# Patient Record
Sex: FEMALE | Race: WHITE | NOT HISPANIC OR LATINO | URBAN - METROPOLITAN AREA
[De-identification: names, ages, dates, MRNs, and addresses within clinical notes are randomized per-mention and may not be internally consistent; named-entity substitution may affect disease eponyms.]

---

## 2018-01-24 ENCOUNTER — OFFICE VISIT CONVERTED (OUTPATIENT)
Dept: SURGERY | Facility: CLINIC | Age: 68
End: 2018-01-24
Attending: SURGERY

## 2018-01-24 ENCOUNTER — CONVERSION ENCOUNTER (OUTPATIENT)
Dept: SURGERY | Facility: CLINIC | Age: 68
End: 2018-01-24

## 2018-03-06 ENCOUNTER — OFFICE VISIT CONVERTED (OUTPATIENT)
Dept: SURGERY | Facility: CLINIC | Age: 68
End: 2018-03-06
Attending: SURGERY

## 2021-05-16 VITALS — BODY MASS INDEX: 37.9 KG/M2 | RESPIRATION RATE: 16 BRPM | WEIGHT: 188 LBS | HEIGHT: 59 IN

## 2021-05-16 VITALS — WEIGHT: 188 LBS | BODY MASS INDEX: 37.9 KG/M2 | HEIGHT: 59 IN | RESPIRATION RATE: 16 BRPM

## 2023-11-27 PROBLEM — N39.41 URINARY INCONTINENCE, URGE: Status: ACTIVE | Noted: 2023-11-27

## 2023-11-27 PROBLEM — K63.9 BOWEL DISEASE: Status: ACTIVE | Noted: 2023-11-27

## 2023-11-27 PROBLEM — I50.9 CONGESTIVE HEART FAILURE: Status: ACTIVE | Noted: 2023-11-27

## 2023-11-27 PROBLEM — N30.00 ACUTE CYSTITIS WITHOUT HEMATURIA: Status: ACTIVE | Noted: 2023-11-27

## 2023-11-27 PROBLEM — N95.2 VAGINAL ATROPHY: Status: ACTIVE | Noted: 2023-11-27

## 2023-11-27 PROBLEM — I10 HIGH BLOOD PRESSURE: Status: ACTIVE | Noted: 2023-11-27

## 2023-11-27 PROBLEM — E78.00 HIGH CHOLESTEROL: Status: ACTIVE | Noted: 2023-11-27

## 2023-11-27 PROBLEM — R06.02 SHORTNESS OF BREATH: Status: ACTIVE | Noted: 2023-11-27

## 2023-11-27 PROBLEM — I63.9 STROKE: Status: ACTIVE | Noted: 2023-11-27

## 2023-11-27 RX ORDER — POTASSIUM CHLORIDE 20MEQ/15ML
15 LIQUID (ML) ORAL
COMMUNITY

## 2023-11-27 RX ORDER — FUROSEMIDE 40 MG/1
TABLET ORAL
COMMUNITY

## 2023-11-27 RX ORDER — METOPROLOL SUCCINATE 50 MG/1
TABLET, EXTENDED RELEASE ORAL
COMMUNITY

## 2023-11-27 RX ORDER — MONTELUKAST SODIUM 10 MG/1
TABLET ORAL
COMMUNITY

## 2023-11-27 RX ORDER — ASPIRIN 81 MG/1
TABLET, CHEWABLE ORAL
COMMUNITY

## 2023-11-27 RX ORDER — TRANDOLAPRIL AND VERAPAMIL HYDROCHLORIDE 2; 180 MG/1; MG/1
TABLET, FILM COATED, EXTENDED RELEASE ORAL
COMMUNITY

## 2023-11-27 RX ORDER — SPIRONOLACTONE 25 MG/1
TABLET ORAL
COMMUNITY

## 2023-11-27 RX ORDER — CLOPIDOGREL BISULFATE 75 MG/1
TABLET ORAL
COMMUNITY

## 2023-11-27 RX ORDER — HYDROCHLOROTHIAZIDE 25 MG/1
TABLET ORAL
COMMUNITY

## 2023-11-27 RX ORDER — CLONIDINE 0.1 MG/24H
PATCH, EXTENDED RELEASE TRANSDERMAL
COMMUNITY

## 2023-11-29 ENCOUNTER — OFFICE VISIT (OUTPATIENT)
Dept: UROLOGY | Facility: CLINIC | Age: 73
End: 2023-11-29
Payer: MEDICARE

## 2023-11-29 VITALS
WEIGHT: 188.2 LBS | BODY MASS INDEX: 37.94 KG/M2 | HEART RATE: 74 BPM | DIASTOLIC BLOOD PRESSURE: 82 MMHG | SYSTOLIC BLOOD PRESSURE: 133 MMHG | HEIGHT: 59 IN

## 2023-11-29 DIAGNOSIS — N28.1 RENAL CYST: ICD-10-CM

## 2023-11-29 DIAGNOSIS — N28.89 RENAL MASS: Primary | ICD-10-CM

## 2023-11-29 PROCEDURE — 99204 OFFICE O/P NEW MOD 45 MIN: CPT | Performed by: UROLOGY

## 2023-11-29 RX ORDER — HYDRALAZINE HYDROCHLORIDE 25 MG/1
TABLET, FILM COATED ORAL
COMMUNITY
Start: 2023-10-22

## 2023-11-29 RX ORDER — ERGOCALCIFEROL 1.25 MG/1
CAPSULE ORAL
COMMUNITY
Start: 2023-10-22

## 2023-11-29 RX ORDER — LOVASTATIN 20 MG/1
TABLET ORAL
COMMUNITY
Start: 2023-10-22

## 2023-11-29 RX ORDER — ALLOPURINOL 300 MG/1
TABLET ORAL
COMMUNITY
Start: 2023-10-22

## 2023-11-29 NOTE — PROGRESS NOTES
UROLOGY OFFICE H&P NOTE    Subjective   HPI  Bisi Bhatt is a 73 y.o. female.  Presents for evaluation of renal mass on her right kidney and cyst on the left. She is accompanied by her .    The patient had a scan done due to abdominal pain. A mass was found on her right kidney and a cyst on the left. Her primary care physician told her that she needed to have another scan done. The radiologist told her that it could be renal cell carcinoma.     She denies any family history of kidney cancer or bladder cancer.   She has a family history of breast cancer.     She denies any history of hematuria.   She denies any history of kidney issues.   She is not diabetic.     She is taking aspirin and Plavix.   She has had a couple of strokes.     She has had hysterectomy and cholecystectomy.   She is not a smoker.   She denies any recent weight loss.      _________  CT abdomen pelvis wwo contrast 10/9/2023: 6 cm exophytic round mass in the upper pole the right kidney of soft tissue attenuation (22 Hounsfield units) demonstrates contrast enhancement (62 Hounsfield units) consistent with a solid mass worrisome for renal cell carcinoma. No tumor thrombus within the right renal vein or inferior vena cava. No adjacent retroperitoneal lymphadenopathy. 4 cm exophytic cyst of the posterior cortex midsection of left kidney.     Creatinine 9/27/2023: 1.1    No results found for this or any previous visit.      Medical History:  Past Medical History:   Diagnosis Date    Hypertension         Social History:  Social History     Socioeconomic History    Marital status:    Tobacco Use    Smoking status: Never    Smokeless tobacco: Never   Vaping Use    Vaping Use: Never used   Substance and Sexual Activity    Alcohol use: Never    Drug use: Never    Sexual activity: Yes     Partners: Male     Birth control/protection: Hysterectomy        Family History:  History reviewed. No pertinent family history.     Surgical  History:  Past Surgical History:   Procedure Laterality Date    APPENDECTOMY      HYSTERECTOMY      TUBAL ABDOMINAL LIGATION          Allergies:  Allergies   Allergen Reactions    Penicillins Anaphylaxis        Current Medications:  Current Outpatient Medications   Medication Sig Dispense Refill    allopurinol (ZYLOPRIM) 300 MG tablet       aspirin 81 MG chewable tablet aspirin 81 mg oral tablet,chewable chew 1 tablet (81 mg) by oral route once daily   Active      clopidogrel (Plavix) 75 MG tablet Plavix 75 mg oral tablet take 1 tablet (75 mg) by oral route once daily   Active      hydrALAZINE (APRESOLINE) 25 MG tablet       lovastatin (MEVACOR) 20 MG tablet       metoprolol succinate XL (Toprol XL) 50 MG 24 hr tablet Toprol XL 50 mg oral tablet extended release 24 hr take 1 tablet (50 mg) by oral route once daily   Active      montelukast (Singulair) 10 MG tablet Singulair 10 mg oral tablet take 1 tablet (10 mg) by oral route once daily in the evening   Active      potassium chloride (KAYCIEL) 20 mEq/15 mL solution 15 mL.      spironolactone (ALDACTONE) 25 MG tablet spironolactone 25 mg oral tablet take 1 tablet (25 mg) by oral route every other day   Active      trandolapril-verapamil (Tarka) 2-180 MG per CR tablet Tarka 2-180 mg oral tablet, IR - ER, biphasic 24hr take 1 tablet by oral route once daily with food   Active      vitamin D (ERGOCALCIFEROL) 1.25 MG (51775 UT) capsule capsule       cloNIDine (CATAPRES-TTS) 0.1 MG/24HR patch clonidine 0.1 mg/24 hr transdermal patch weekly apply 1 patch by transdermal route once weekly   Active (Patient not taking: Reported on 11/29/2023)      furosemide (Lasix) 40 MG tablet Lasix 40 mg oral tablet take 1 tablet (40 mg) by oral route once daily   Active (Patient not taking: Reported on 11/29/2023)      hydroCHLOROthiazide (HYDRODIURIL) 25 MG tablet hydrochlorothiazide 25 mg oral tablet take 1 tablet (25 mg) by oral route once daily   Active (Patient not taking: Reported  "on 11/29/2023)      loratadine-pseudoephedrine (CLARITIN-D 24-hour)  MG per 24 hr tablet lorata-dine D  mg oral tablet extended release 24 hr take 1 tablet by oral route once daily   Active (Patient not taking: Reported on 11/29/2023)      Probiotic Product (BACID PO) Bacid 1 billion-250 cell-mg oral tablet take 1 tablet by oral route 3 times a day   Active (Patient not taking: Reported on 11/29/2023)       No current facility-administered medications for this visit.       Review of systems  A review of systems was performed, and positive findings are noted in the HPI.    Objective     Vital Signs:   /82   Pulse 74   Ht 149.9 cm (59\")   Wt 85.4 kg (188 lb 3.2 oz)   BMI 38.01 kg/m²       Physical exam  No acute distress, well-nourished  Lungs: Clear, unlabored  CV: Regular rate, no chest retractions  Awake alert and oriented  Mood normal; affect normal    Results  Impression    Renal mass protocol CT confirm this contrast enhancement of the solid mass in the upper pole the right kidney worrisome for renal cell carcinoma without evidence of metastatic disease.    Electronically Signed:  Evan Hodge MD  2023/10/10 at 9:44 CDT  Reading Location ID and State: [address] / [place]  Tel [phone], Service support  [phone], Fax [phone]  Narrative      REPORT-ID:[id number]    STUDY:  CT ABDOMEN AND PELVIS WITH AND WITHOUT CONTRAST    REASON FOR EXAM:   Female, 73 years old.  Other specified disorders of kidney and ureter    RADIATION DOSAGE (If Supplied By Facility):  CTDIvol = ( 57.6 ) mGy, DLP = ( 2729.3 ) mGycm    TECHNIQUE:   Transaxial images were obtained from the dome of the diaphragm to the symphysis pubis with oral contrast.  IV/Oral Optiray 320 100 was administered.  Sagittal and coronal images were reconstructed.    Individualized dose optimization techniques were used for this CT.    COMPARISON:   9/27/2023  ___________________________________    FINDINGS:  The " visualized lung bases are unremarkable.  The visualized portions of the heart are within normal limits.    Normal liver.  There are surgical clips in the gallbladder fossa consistent with a prior cholecystectomy.  Normal spleen.  Normal pancreas.    Normal bilateral adrenal glands.    6 cm exophytic round mass in the upper pole the right kidney of soft tissue attenuation (22 Hounsfield units) demonstrates contrast enhancement (62 Hounsfield units) consistent with a solid mass worrisome for renal cell carcinoma. No tumor thrombus within the right renal vein or inferior vena cava. No adjacent retroperitoneal lymphadenopathy. 4 cm exophytic cyst of the posterior cortex midsection of left kidney.    Normal visualized stomach.  Normal small intestine.  There are multiple colonic diverticula consistent with diverticulosis.  There is non-visualization of the appendix.    Normal abdominal aorta.  Normal inferior vena cava.  Normal retroperitoneum.    Normal urinary bladder.    Normal abdominal wall.  Normal osseous structures.  ___________________________________  Exam End: 10/09/23 11:04 Last Resulted: 10/10/23 10:44       Problem List:  Patient Active Problem List   Diagnosis    Bowel disease    Congestive heart failure    High blood pressure    High cholesterol    Shortness of breath    Stroke    Urinary incontinence, urge    Vaginal atrophy    Acute cystitis without hematuria       Assessment & Plan   Diagnoses and all orders for this visit:    1. Renal mass (Primary)    2. Renal cyst    CT scan imaging personally reviewed by me, demonstrated discussed with patient and  at length    Left renal cyst-appears to be a simple benign renal cyst; discussed with patient that [place] 1 & 2 cysts are benign, have no malignant potential, may change in size over time, but do not warrant routine surveillance or dedicated management. They are generally considered sporadic and mostly clinically inconsequential.     Right renal  mass, 6 cm exophytic round mass in the upper pole the right kidney of soft tissue attenuation consistent with a solid mass worrisome for renal cell carcinoma. Warrant surgical management.  Given size of mass in location, involvement of normal renal parenchyma, do not believe it to be amenable to nephron sparing surgery.  Recommend right radical nephrectomy    Did offer tertiary referral for consideration of partial nephrectomy if patient desires however, believe that if partial nephrectomy attempted, will be at high risk for radical nephrectomy.  If wishes to pursue treatment here, will be via right radical nephrectomy.     Risks, benefits, alternatives discussed at length.  These include but are not limited to bleeding, infection, pain, damage to surrounding structures, need for further procedures, irreversible loss of renal function, residual tumor, or cancer recurrence, benign disease.  Patient also aware that diagnosis remains unclear until pathologic evaluation is complete.  Also aware that this is a procedure performed under anesthesia which has inherent risks including up to death.  Patient notes understanding of these risks, participated in the discussion, and asked appropriate questions, and she is agreeable.     Has appoint with PCP next week, will discuss further with him.  Patient uncertain if she would wish to pursue tertiary referral for consideration of partial nephrectomy and would like to get on the OR schedule for radical nephrectomy unless PCP recommends consideration of other approach.    All questions addressed at this time to the best of my ability and her apparent satisfaction.      Schedule for OR            Transcribed from ambient dictation for Genesis Suarez MD by Bisi Brady.  11/29/23   13:56 EST    Patient or patient representative verbalized consent to the visit recording.  I have personally performed the services described in this document as transcribed by the above individual,  and it is both accurate and complete.

## 2023-12-05 ENCOUNTER — PREP FOR SURGERY (OUTPATIENT)
Dept: OTHER | Facility: HOSPITAL | Age: 73
End: 2023-12-05
Payer: MEDICARE

## 2023-12-05 DIAGNOSIS — N28.89 RENAL MASS, RIGHT: Primary | ICD-10-CM

## 2023-12-06 PROBLEM — N28.89 RENAL MASS, RIGHT: Status: ACTIVE | Noted: 2023-12-05

## 2023-12-27 ENCOUNTER — HOSPITAL ENCOUNTER (OUTPATIENT)
Dept: GENERAL RADIOLOGY | Facility: HOSPITAL | Age: 73
Discharge: HOME | End: 2023-12-27
Payer: MEDICARE

## 2023-12-27 ENCOUNTER — PRE-ADMISSION TESTING (OUTPATIENT)
Dept: PREADMISSION TESTING | Facility: HOSPITAL | Age: 73
End: 2023-12-27
Payer: MEDICARE

## 2023-12-27 VITALS
BODY MASS INDEX: 38 KG/M2 | SYSTOLIC BLOOD PRESSURE: 142 MMHG | OXYGEN SATURATION: 94 % | TEMPERATURE: 99 F | HEART RATE: 65 BPM | HEIGHT: 59 IN | RESPIRATION RATE: 18 BRPM | WEIGHT: 188.49 LBS | DIASTOLIC BLOOD PRESSURE: 84 MMHG

## 2023-12-27 DIAGNOSIS — N28.89 RENAL MASS, RIGHT: ICD-10-CM

## 2023-12-27 DIAGNOSIS — Z01.818 ENCOUNTER FOR PREADMISSION TESTING: Primary | ICD-10-CM

## 2023-12-27 LAB
ABO GROUP BLD: NORMAL
ALBUMIN SERPL-MCNC: 3.6 G/DL (ref 3.5–5.2)
ALBUMIN/GLOB SERPL: 1.2 G/DL
ALP SERPL-CCNC: 157 U/L (ref 39–117)
ALT SERPL W P-5'-P-CCNC: 7 U/L (ref 1–33)
ANION GAP SERPL CALCULATED.3IONS-SCNC: 10.5 MMOL/L (ref 5–15)
AST SERPL-CCNC: 10 U/L (ref 1–32)
BILIRUB SERPL-MCNC: 0.3 MG/DL (ref 0–1.2)
BILIRUB UR QL STRIP: NEGATIVE
BUN SERPL-MCNC: 20 MG/DL (ref 8–23)
BUN/CREAT SERPL: 21.3 (ref 7–25)
CALCIUM SPEC-SCNC: 9.2 MG/DL (ref 8.6–10.5)
CHLORIDE SERPL-SCNC: 102 MMOL/L (ref 98–107)
CLARITY UR: CLEAR
CO2 SERPL-SCNC: 25.5 MMOL/L (ref 22–29)
COLOR UR: YELLOW
CREAT SERPL-MCNC: 0.94 MG/DL (ref 0.57–1)
DEPRECATED RDW RBC AUTO: 51.5 FL (ref 37–54)
EGFRCR SERPLBLD CKD-EPI 2021: 63.8 ML/MIN/1.73
ERYTHROCYTE [DISTWIDTH] IN BLOOD BY AUTOMATED COUNT: 15.1 % (ref 12.3–15.4)
GLOBULIN UR ELPH-MCNC: 2.9 GM/DL
GLUCOSE SERPL-MCNC: 108 MG/DL (ref 65–99)
GLUCOSE UR STRIP-MCNC: NEGATIVE MG/DL
HCT VFR BLD AUTO: 44.4 % (ref 34–46.6)
HGB BLD-MCNC: 14.1 G/DL (ref 12–15.9)
HGB UR QL STRIP.AUTO: NEGATIVE
KETONES UR QL STRIP: NEGATIVE
LEUKOCYTE ESTERASE UR QL STRIP.AUTO: NEGATIVE
MCH RBC QN AUTO: 29.4 PG (ref 26.6–33)
MCHC RBC AUTO-ENTMCNC: 31.8 G/DL (ref 31.5–35.7)
MCV RBC AUTO: 92.7 FL (ref 79–97)
NITRITE UR QL STRIP: NEGATIVE
PH UR STRIP.AUTO: 6 [PH] (ref 5–8)
PLATELET # BLD AUTO: 333 10*3/MM3 (ref 140–450)
PMV BLD AUTO: 10.5 FL (ref 6–12)
POTASSIUM SERPL-SCNC: 3.7 MMOL/L (ref 3.5–5.2)
PROT SERPL-MCNC: 6.5 G/DL (ref 6–8.5)
PROT UR QL STRIP: NEGATIVE
RBC # BLD AUTO: 4.79 10*6/MM3 (ref 3.77–5.28)
RH BLD: POSITIVE
SODIUM SERPL-SCNC: 138 MMOL/L (ref 136–145)
SP GR UR STRIP: 1.01 (ref 1–1.03)
UROBILINOGEN UR QL STRIP: NORMAL
WBC NRBC COR # BLD AUTO: 12.98 10*3/MM3 (ref 3.4–10.8)

## 2023-12-27 PROCEDURE — 80053 COMPREHEN METABOLIC PANEL: CPT

## 2023-12-27 PROCEDURE — 36415 COLL VENOUS BLD VENIPUNCTURE: CPT

## 2023-12-27 PROCEDURE — 86901 BLOOD TYPING SEROLOGIC RH(D): CPT

## 2023-12-27 PROCEDURE — 81003 URINALYSIS AUTO W/O SCOPE: CPT

## 2023-12-27 PROCEDURE — 71046 X-RAY EXAM CHEST 2 VIEWS: CPT

## 2023-12-27 PROCEDURE — 86900 BLOOD TYPING SEROLOGIC ABO: CPT

## 2023-12-27 PROCEDURE — 85027 COMPLETE CBC AUTOMATED: CPT

## 2023-12-27 PROCEDURE — 93005 ELECTROCARDIOGRAM TRACING: CPT

## 2023-12-27 RX ORDER — LORATADINE 10 MG/1
10 TABLET ORAL DAILY
Status: ON HOLD | COMMUNITY

## 2023-12-27 RX ORDER — ZINC GLUCONATE 50 MG
50 TABLET ORAL DAILY
Status: ON HOLD | COMMUNITY

## 2023-12-27 RX ORDER — POTASSIUM CHLORIDE 750 MG/1
10 TABLET, FILM COATED, EXTENDED RELEASE ORAL DAILY
Status: ON HOLD | COMMUNITY

## 2023-12-27 RX ORDER — FLUTICASONE PROPIONATE 50 MCG
2 SPRAY, SUSPENSION (ML) NASAL DAILY PRN
Status: ON HOLD | COMMUNITY
Start: 2023-12-07

## 2023-12-27 NOTE — DISCHARGE INSTRUCTIONS
IMPORTANT INSTRUCTIONS - PRE-ADMISSION TESTING  DO NOT EAT OR CHEW anything after midnight the night before your procedure.    You may have CLEAR liquids up to 2 hours prior to ARRIVAL time.   Take the following medications the morning of your procedure with JUST A SIP OF WATER:  Allopurinol, AllerClear, Montelukast    DO NOT BRING your medications to the hospital with you, UNLESS something has changed since your PRE-Admission Testing appointment.  Hold all vitamins, supplements, and NSAIDS (Non- steroidal anti-inflammatory meds) for one week prior to surgery (you MAY take Tylenol or Acetaminophen).  Make sure you have a ride home and have someone who will stay with you the day of your procedure after you go home.  If you have any questions, please call your Pre-Admission Testing Nurse,Amy at [phone].   Per anesthesia request, do not smoke for 24 hours before your procedure or as instructed by your surgeon.    You will receive a call the Friday before surgery with an arrival time.    Clear Liquid Diet        Find out when you need to start a clear liquid diet.   Think of “clear liquids” as anything you could read a newspaper through. This includes things like water, broth, sports drinks, or tea WITHOUT any kind of milk or cream.           Once you are told to start a clear liquid diet, only drink these things until 2 hours before arrival to the hospital or when the hospital says to stop. Total volume limitation: 8 oz.       Clear liquids you CAN drink:   Water   Clear broth: beef, chicken, vegetable, or bone broth with nothing in it   Gatorade   Lemonade or Pete-aid   Soda   Tea, coffee (NO cream or honey)   Jell-O (without fruit)   Popsicles (without fruit or cream)   Italian ices   Juice without pulp: apple, white, grape   You may use salt, pepper, and sugar    Do NOT drink:   Milk or cream   Soy milk, almond milk, coconut milk, or other non-dairy drinks and   creamers   Milkshakes or smoothies   Tomato  juice   Orange juice   Grapefruit juice   Cream soups or any other than broth         Clear Liquid Diet:  Do NOT eat any solid food.  Do NOT eat or suck on mints or candy.  Do NOT chew gum.  Do NOT drink thick liquids like milk or juice with pulp in it.  Do NOT add milk, cream, or anything like soy milk or almond milk to coffee or tea.       Use surgical soap night before surgery or morning of surgery.  Do not wash hair, face or private area with surgical soap  After shower do not use lotion, deodorant, make - up, fingernail polish, leave all jewelry at home.

## 2023-12-28 ENCOUNTER — TELEPHONE (OUTPATIENT)
Dept: UROLOGY | Facility: CLINIC | Age: 73
End: 2023-12-28
Payer: MEDICARE

## 2023-12-28 NOTE — TELEPHONE ENCOUNTER
PER SALLY IN PAT PATIENT HAS A WBC OF 12.98 AND PATIENT STATES THAT THIS IS A CHRONIC THING WITH HER. #[id number].   normal

## 2023-12-28 NOTE — NURSING NOTE
Patient's WBC 12.98. Patient states she always has a high WBC. Porsche/Erick notified. No orders at this time.

## 2023-12-28 NOTE — TELEPHONE ENCOUNTER
I mean her white blood cell count could just be slightly elevated because of the kidney cancer Her white blood cell count is actually lower than it has been in quite some time.  Back in 2014 it was up to 20 and the last one before this was 15 so I think this is perfectly fine this is where she lives..

## 2023-12-29 LAB
QT INTERVAL: 416 MS
QTC INTERVAL: 424 MS

## 2024-01-08 ENCOUNTER — ANESTHESIA EVENT (OUTPATIENT)
Dept: PERIOP | Facility: HOSPITAL | Age: 74
End: 2024-01-08
Payer: MEDICARE

## 2024-01-08 ENCOUNTER — ANESTHESIA (OUTPATIENT)
Dept: PERIOP | Facility: HOSPITAL | Age: 74
End: 2024-01-08
Payer: MEDICARE

## 2024-01-08 ENCOUNTER — HOSPITAL ENCOUNTER (INPATIENT)
Facility: HOSPITAL | Age: 74
LOS: 1 days | Discharge: HOME | End: 2024-01-09
Attending: UROLOGY | Admitting: UROLOGY
Payer: MEDICARE

## 2024-01-08 ENCOUNTER — ANESTHESIA EVENT CONVERTED (OUTPATIENT)
Dept: ANESTHESIOLOGY | Facility: HOSPITAL | Age: 74
End: 2024-01-08
Payer: MEDICARE

## 2024-01-08 DIAGNOSIS — N28.89 RIGHT RENAL MASS: Primary | ICD-10-CM

## 2024-01-08 DIAGNOSIS — N28.89 RENAL MASS, RIGHT: ICD-10-CM

## 2024-01-08 LAB
ABO GROUP BLD: NORMAL
BLD GP AB SCN SERPL QL: NEGATIVE
RH BLD: POSITIVE
T&S EXPIRATION DATE: NORMAL

## 2024-01-08 PROCEDURE — 25010000002 BUPIVACAINE (PF) 0.5 % SOLUTION: Performed by: UROLOGY

## 2024-01-08 PROCEDURE — 25010000002 ONDANSETRON PER 1 MG: Performed by: UROLOGY

## 2024-01-08 PROCEDURE — 25010000002 MIDAZOLAM PER 1MG: Performed by: ANESTHESIOLOGY

## 2024-01-08 PROCEDURE — 86850 RBC ANTIBODY SCREEN: CPT | Performed by: UROLOGY

## 2024-01-08 PROCEDURE — 86900 BLOOD TYPING SEROLOGIC ABO: CPT | Performed by: UROLOGY

## 2024-01-08 PROCEDURE — 8E0W4CZ ROBOTIC ASSISTED PROCEDURE OF TRUNK REGION, PERCUTANEOUS ENDOSCOPIC APPROACH: ICD-10-PCS | Performed by: UROLOGY

## 2024-01-08 PROCEDURE — 99222 1ST HOSP IP/OBS MODERATE 55: CPT | Performed by: INTERNAL MEDICINE

## 2024-01-08 PROCEDURE — S0260 H&P FOR SURGERY: HCPCS | Performed by: UROLOGY

## 2024-01-08 PROCEDURE — 25010000002 FENTANYL CITRATE (PF) 50 MCG/ML SOLUTION: Performed by: MARRIAGE & FAMILY THERAPIST

## 2024-01-08 PROCEDURE — 25010000002 PROPOFOL 10 MG/ML EMULSION: Performed by: MARRIAGE & FAMILY THERAPIST

## 2024-01-08 PROCEDURE — 25810000003 LACTATED RINGERS PER 1000 ML: Performed by: ANESTHESIOLOGY

## 2024-01-08 PROCEDURE — 25010000002 HYDROMORPHONE 1 MG/ML SOLUTION: Performed by: MARRIAGE & FAMILY THERAPIST

## 2024-01-08 PROCEDURE — 25010000002 DEXAMETHASONE PER 1 MG: Performed by: MARRIAGE & FAMILY THERAPIST

## 2024-01-08 PROCEDURE — 86901 BLOOD TYPING SEROLOGIC RH(D): CPT | Performed by: UROLOGY

## 2024-01-08 PROCEDURE — 0TT04ZZ RESECTION OF RIGHT KIDNEY, PERCUTANEOUS ENDOSCOPIC APPROACH: ICD-10-PCS | Performed by: UROLOGY

## 2024-01-08 PROCEDURE — 25010000002 SUGAMMADEX 200 MG/2ML SOLUTION: Performed by: NURSE ANESTHETIST, CERTIFIED REGISTERED

## 2024-01-08 PROCEDURE — 25010000002 ONDANSETRON PER 1 MG: Performed by: NURSE ANESTHETIST, CERTIFIED REGISTERED

## 2024-01-08 PROCEDURE — 50545 LAPARO RADICAL NEPHRECTOMY: CPT | Performed by: UROLOGY

## 2024-01-08 PROCEDURE — 0 HYDROMORPHONE 1 MG/ML SOLUTION: Performed by: MARRIAGE & FAMILY THERAPIST

## 2024-01-08 PROCEDURE — 25010000002 MORPHINE PER 10 MG: Performed by: UROLOGY

## 2024-01-08 PROCEDURE — 25010000002 GLYCOPYRROLATE 0.2 MG/ML SOLUTION: Performed by: MARRIAGE & FAMILY THERAPIST

## 2024-01-08 PROCEDURE — 25010000002 VANCOMYCIN 5 G RECONSTITUTED SOLUTION: Performed by: UROLOGY

## 2024-01-08 PROCEDURE — 88307 TISSUE EXAM BY PATHOLOGIST: CPT | Performed by: UROLOGY

## 2024-01-08 PROCEDURE — 88341 IMHCHEM/IMCYTCHM EA ADD ANTB: CPT | Performed by: UROLOGY

## 2024-01-08 PROCEDURE — 88342 IMHCHEM/IMCYTCHM 1ST ANTB: CPT | Performed by: UROLOGY

## 2024-01-08 PROCEDURE — 25810000003 SODIUM CHLORIDE 0.9 % SOLUTION: Performed by: UROLOGY

## 2024-01-08 DEVICE — ENDOPATH ECHELON ENDOSCOPIC LINEAR CUTTER RELOADS, WHITE, 60MM
Type: IMPLANTABLE DEVICE | Site: ABDOMEN | Status: FUNCTIONAL
Brand: ECHELON ENDOPATH

## 2024-01-08 DEVICE — CLIP LIG HEMOLOK PA LG 6CT PRP: Type: IMPLANTABLE DEVICE | Site: ABDOMEN | Status: FUNCTIONAL

## 2024-01-08 DEVICE — SEAL HEMO SURG ARISTA/AH ABS/PWDR 3GM: Type: IMPLANTABLE DEVICE | Site: ABDOMEN | Status: FUNCTIONAL

## 2024-01-08 RX ORDER — LIDOCAINE HYDROCHLORIDE 20 MG/ML
INJECTION, SOLUTION EPIDURAL; INFILTRATION; INTRACAUDAL; PERINEURAL AS NEEDED
Status: DISCONTINUED | OUTPATIENT
Start: 2024-01-08 | End: 2024-01-08 | Stop reason: SURG

## 2024-01-08 RX ORDER — PROMETHAZINE HYDROCHLORIDE 12.5 MG/1
25 TABLET ORAL ONCE AS NEEDED
Status: DISCONTINUED | OUTPATIENT
Start: 2024-01-08 | End: 2024-01-08 | Stop reason: HOSPADM

## 2024-01-08 RX ORDER — MORPHINE SULFATE 2 MG/ML
2 INJECTION, SOLUTION INTRAMUSCULAR; INTRAVENOUS
Status: DISCONTINUED | OUTPATIENT
Start: 2024-01-08 | End: 2024-01-09 | Stop reason: HOSPADM

## 2024-01-08 RX ORDER — DEXAMETHASONE SODIUM PHOSPHATE 4 MG/ML
INJECTION, SOLUTION INTRA-ARTICULAR; INTRALESIONAL; INTRAMUSCULAR; INTRAVENOUS; SOFT TISSUE AS NEEDED
Status: DISCONTINUED | OUTPATIENT
Start: 2024-01-08 | End: 2024-01-08 | Stop reason: SURG

## 2024-01-08 RX ORDER — PROMETHAZINE HYDROCHLORIDE 25 MG/1
25 SUPPOSITORY RECTAL ONCE AS NEEDED
Status: DISCONTINUED | OUTPATIENT
Start: 2024-01-08 | End: 2024-01-08 | Stop reason: HOSPADM

## 2024-01-08 RX ORDER — NALOXONE HCL 0.4 MG/ML
0.4 VIAL (ML) INJECTION
Status: DISCONTINUED | OUTPATIENT
Start: 2024-01-08 | End: 2024-01-09 | Stop reason: HOSPADM

## 2024-01-08 RX ORDER — ONDANSETRON 2 MG/ML
4 INJECTION INTRAMUSCULAR; INTRAVENOUS EVERY 6 HOURS PRN
Status: DISCONTINUED | OUTPATIENT
Start: 2024-01-08 | End: 2024-01-09 | Stop reason: HOSPADM

## 2024-01-08 RX ORDER — ONDANSETRON 4 MG/1
4 TABLET, ORALLY DISINTEGRATING ORAL EVERY 6 HOURS PRN
Status: DISCONTINUED | OUTPATIENT
Start: 2024-01-08 | End: 2024-01-09 | Stop reason: HOSPADM

## 2024-01-08 RX ORDER — FLUTICASONE PROPIONATE 50 MCG
2 SPRAY, SUSPENSION (ML) NASAL DAILY PRN
Status: DISCONTINUED | OUTPATIENT
Start: 2024-01-08 | End: 2024-01-09 | Stop reason: HOSPADM

## 2024-01-08 RX ORDER — HYDROCODONE BITARTRATE AND ACETAMINOPHEN 10; 325 MG/1; MG/1
1 TABLET ORAL EVERY 4 HOURS PRN
Status: DISCONTINUED | OUTPATIENT
Start: 2024-01-08 | End: 2024-01-09 | Stop reason: HOSPADM

## 2024-01-08 RX ORDER — MIDAZOLAM HYDROCHLORIDE 2 MG/2ML
2 INJECTION, SOLUTION INTRAMUSCULAR; INTRAVENOUS ONCE
Status: COMPLETED | OUTPATIENT
Start: 2024-01-08 | End: 2024-01-08

## 2024-01-08 RX ORDER — SODIUM CHLORIDE 0.9 % (FLUSH) 0.9 %
10 SYRINGE (ML) INJECTION AS NEEDED
Status: DISCONTINUED | OUTPATIENT
Start: 2024-01-08 | End: 2024-01-09 | Stop reason: HOSPADM

## 2024-01-08 RX ORDER — SODIUM CHLORIDE 0.9 % (FLUSH) 0.9 %
10 SYRINGE (ML) INJECTION EVERY 12 HOURS SCHEDULED
Status: DISCONTINUED | OUTPATIENT
Start: 2024-01-08 | End: 2024-01-09 | Stop reason: HOSPADM

## 2024-01-08 RX ORDER — HYDROCODONE BITARTRATE AND ACETAMINOPHEN 5; 325 MG/1; MG/1
1 TABLET ORAL EVERY 4 HOURS PRN
Status: DISCONTINUED | OUTPATIENT
Start: 2024-01-08 | End: 2024-01-09 | Stop reason: HOSPADM

## 2024-01-08 RX ORDER — ACETAMINOPHEN 325 MG/1
650 TABLET ORAL EVERY 4 HOURS PRN
Status: DISCONTINUED | OUTPATIENT
Start: 2024-01-08 | End: 2024-01-09 | Stop reason: HOSPADM

## 2024-01-08 RX ORDER — ROCURONIUM BROMIDE 10 MG/ML
INJECTION, SOLUTION INTRAVENOUS AS NEEDED
Status: DISCONTINUED | OUTPATIENT
Start: 2024-01-08 | End: 2024-01-08 | Stop reason: SURG

## 2024-01-08 RX ORDER — BUPIVACAINE HYDROCHLORIDE 5 MG/ML
INJECTION, SOLUTION EPIDURAL; INTRACAUDAL AS NEEDED
Status: DISCONTINUED | OUTPATIENT
Start: 2024-01-08 | End: 2024-01-08 | Stop reason: HOSPADM

## 2024-01-08 RX ORDER — HYDROCODONE BITARTRATE AND ACETAMINOPHEN 5; 325 MG/1; MG/1
1 TABLET ORAL ONCE AS NEEDED
Status: DISCONTINUED | OUTPATIENT
Start: 2024-01-08 | End: 2024-01-08 | Stop reason: HOSPADM

## 2024-01-08 RX ORDER — ONDANSETRON 2 MG/ML
INJECTION INTRAMUSCULAR; INTRAVENOUS AS NEEDED
Status: DISCONTINUED | OUTPATIENT
Start: 2024-01-08 | End: 2024-01-08 | Stop reason: SURG

## 2024-01-08 RX ORDER — SODIUM CHLORIDE, SODIUM LACTATE, POTASSIUM CHLORIDE, CALCIUM CHLORIDE 600; 310; 30; 20 MG/100ML; MG/100ML; MG/100ML; MG/100ML
9 INJECTION, SOLUTION INTRAVENOUS CONTINUOUS PRN
Status: DISCONTINUED | OUTPATIENT
Start: 2024-01-08 | End: 2024-01-09 | Stop reason: HOSPADM

## 2024-01-08 RX ORDER — MEPERIDINE HYDROCHLORIDE 25 MG/ML
12.5 INJECTION INTRAMUSCULAR; INTRAVENOUS; SUBCUTANEOUS
Status: DISCONTINUED | OUTPATIENT
Start: 2024-01-08 | End: 2024-01-08 | Stop reason: HOSPADM

## 2024-01-08 RX ORDER — GLYCOPYRROLATE 0.2 MG/ML
INJECTION INTRAMUSCULAR; INTRAVENOUS AS NEEDED
Status: DISCONTINUED | OUTPATIENT
Start: 2024-01-08 | End: 2024-01-08 | Stop reason: SURG

## 2024-01-08 RX ORDER — ACETAMINOPHEN 500 MG
1000 TABLET ORAL ONCE
Status: COMPLETED | OUTPATIENT
Start: 2024-01-08 | End: 2024-01-08

## 2024-01-08 RX ORDER — MAGNESIUM HYDROXIDE 1200 MG/15ML
LIQUID ORAL AS NEEDED
Status: DISCONTINUED | OUTPATIENT
Start: 2024-01-08 | End: 2024-01-08 | Stop reason: HOSPADM

## 2024-01-08 RX ORDER — DOCUSATE SODIUM 100 MG/1
100 CAPSULE, LIQUID FILLED ORAL 2 TIMES DAILY PRN
Status: DISCONTINUED | OUTPATIENT
Start: 2024-01-08 | End: 2024-01-09 | Stop reason: HOSPADM

## 2024-01-08 RX ORDER — SODIUM CHLORIDE, SODIUM LACTATE, POTASSIUM CHLORIDE, CALCIUM CHLORIDE 600; 310; 30; 20 MG/100ML; MG/100ML; MG/100ML; MG/100ML
100 INJECTION, SOLUTION INTRAVENOUS CONTINUOUS
Status: DISCONTINUED | OUTPATIENT
Start: 2024-01-08 | End: 2024-01-09 | Stop reason: HOSPADM

## 2024-01-08 RX ORDER — FENTANYL CITRATE 50 UG/ML
INJECTION, SOLUTION INTRAMUSCULAR; INTRAVENOUS AS NEEDED
Status: DISCONTINUED | OUTPATIENT
Start: 2024-01-08 | End: 2024-01-08 | Stop reason: SURG

## 2024-01-08 RX ORDER — KETAMINE HCL IN NACL, ISO-OSM 100MG/10ML
SYRINGE (ML) INJECTION AS NEEDED
Status: DISCONTINUED | OUTPATIENT
Start: 2024-01-08 | End: 2024-01-08 | Stop reason: SURG

## 2024-01-08 RX ORDER — ATORVASTATIN CALCIUM 10 MG/1
10 TABLET, FILM COATED ORAL NIGHTLY
Status: DISCONTINUED | OUTPATIENT
Start: 2024-01-09 | End: 2024-01-09 | Stop reason: HOSPADM

## 2024-01-08 RX ORDER — BUPIVACAINE HYDROCHLORIDE AND EPINEPHRINE 2.5; 5 MG/ML; UG/ML
INJECTION, SOLUTION EPIDURAL; INFILTRATION; INTRACAUDAL; PERINEURAL
Status: COMPLETED | OUTPATIENT
Start: 2024-01-08 | End: 2024-01-08

## 2024-01-08 RX ORDER — ONDANSETRON 2 MG/ML
4 INJECTION INTRAMUSCULAR; INTRAVENOUS ONCE AS NEEDED
Status: DISCONTINUED | OUTPATIENT
Start: 2024-01-08 | End: 2024-01-08 | Stop reason: HOSPADM

## 2024-01-08 RX ORDER — ACETAMINOPHEN 650 MG/1
650 SUPPOSITORY RECTAL EVERY 4 HOURS PRN
Status: DISCONTINUED | OUTPATIENT
Start: 2024-01-08 | End: 2024-01-09 | Stop reason: HOSPADM

## 2024-01-08 RX ORDER — CETIRIZINE HYDROCHLORIDE 10 MG/1
10 TABLET ORAL DAILY
Status: DISCONTINUED | OUTPATIENT
Start: 2024-01-09 | End: 2024-01-09 | Stop reason: HOSPADM

## 2024-01-08 RX ORDER — PROPOFOL 10 MG/ML
VIAL (ML) INTRAVENOUS AS NEEDED
Status: DISCONTINUED | OUTPATIENT
Start: 2024-01-08 | End: 2024-01-08 | Stop reason: SURG

## 2024-01-08 RX ORDER — SODIUM CHLORIDE 9 MG/ML
40 INJECTION, SOLUTION INTRAVENOUS AS NEEDED
Status: DISCONTINUED | OUTPATIENT
Start: 2024-01-08 | End: 2024-01-09 | Stop reason: HOSPADM

## 2024-01-08 RX ORDER — MONTELUKAST SODIUM 10 MG/1
10 TABLET ORAL DAILY
Status: DISCONTINUED | OUTPATIENT
Start: 2024-01-09 | End: 2024-01-09 | Stop reason: HOSPADM

## 2024-01-08 RX ORDER — BACLOFEN 10 MG/1
10 TABLET ORAL 3 TIMES DAILY PRN
Status: DISCONTINUED | OUTPATIENT
Start: 2024-01-08 | End: 2024-01-09 | Stop reason: HOSPADM

## 2024-01-08 RX ADMIN — GLYCOPYRROLATE 0.1 MG: 0.2 INJECTION INTRAMUSCULAR; INTRAVENOUS at 13:25

## 2024-01-08 RX ADMIN — Medication 1250 MG: at 13:31

## 2024-01-08 RX ADMIN — ROCURONIUM BROMIDE 50 MG: 10 INJECTION, SOLUTION INTRAVENOUS at 13:25

## 2024-01-08 RX ADMIN — Medication 10 ML: at 21:00

## 2024-01-08 RX ADMIN — PROPOFOL 50 MG: 10 INJECTION, EMULSION INTRAVENOUS at 14:11

## 2024-01-08 RX ADMIN — Medication 25 MG: at 14:24

## 2024-01-08 RX ADMIN — MIDAZOLAM HYDROCHLORIDE 2 MG: 1 INJECTION, SOLUTION INTRAMUSCULAR; INTRAVENOUS at 12:06

## 2024-01-08 RX ADMIN — BUPIVACAINE HYDROCHLORIDE AND EPINEPHRINE BITARTRATE 60 ML: 2.5; .0091 INJECTION, SOLUTION EPIDURAL; INFILTRATION; INTRACAUDAL; PERINEURAL at 12:06

## 2024-01-08 RX ADMIN — ROCURONIUM BROMIDE 10 MG: 10 INJECTION, SOLUTION INTRAVENOUS at 14:11

## 2024-01-08 RX ADMIN — DEXAMETHASONE SODIUM PHOSPHATE 8 MG: 4 INJECTION, SOLUTION INTRAMUSCULAR; INTRAVENOUS at 13:25

## 2024-01-08 RX ADMIN — PROPOFOL 50 MG: 10 INJECTION, EMULSION INTRAVENOUS at 14:20

## 2024-01-08 RX ADMIN — FENTANYL CITRATE 50 MCG: 50 INJECTION, SOLUTION INTRAMUSCULAR; INTRAVENOUS at 14:10

## 2024-01-08 RX ADMIN — Medication 25 MG: at 14:12

## 2024-01-08 RX ADMIN — MORPHINE SULFATE 2 MG: 2 INJECTION, SOLUTION INTRAMUSCULAR; INTRAVENOUS at 20:06

## 2024-01-08 RX ADMIN — LIDOCAINE HYDROCHLORIDE 50 MG: 20 INJECTION, SOLUTION EPIDURAL; INFILTRATION; INTRACAUDAL; PERINEURAL at 13:25

## 2024-01-08 RX ADMIN — MIDAZOLAM HYDROCHLORIDE 2 MG: 1 INJECTION, SOLUTION INTRAMUSCULAR; INTRAVENOUS at 11:46

## 2024-01-08 RX ADMIN — HYDROMORPHONE HYDROCHLORIDE 0.5 MG: 1 INJECTION, SOLUTION INTRAMUSCULAR; INTRAVENOUS; SUBCUTANEOUS at 17:13

## 2024-01-08 RX ADMIN — SUGAMMADEX 200 MG: 100 INJECTION, SOLUTION INTRAVENOUS at 15:58

## 2024-01-08 RX ADMIN — ACETAMINOPHEN 1000 MG: 500 TABLET ORAL at 11:01

## 2024-01-08 RX ADMIN — SODIUM CHLORIDE, POTASSIUM CHLORIDE, SODIUM LACTATE AND CALCIUM CHLORIDE: 600; 310; 30; 20 INJECTION, SOLUTION INTRAVENOUS at 14:45

## 2024-01-08 RX ADMIN — SODIUM CHLORIDE, POTASSIUM CHLORIDE, SODIUM LACTATE AND CALCIUM CHLORIDE: 600; 310; 30; 20 INJECTION, SOLUTION INTRAVENOUS at 13:16

## 2024-01-08 RX ADMIN — HYDROMORPHONE HYDROCHLORIDE 0.5 MG: 1 INJECTION, SOLUTION INTRAMUSCULAR; INTRAVENOUS; SUBCUTANEOUS at 14:25

## 2024-01-08 RX ADMIN — VANCOMYCIN HYDROCHLORIDE 1250 MG: 5 INJECTION, POWDER, LYOPHILIZED, FOR SOLUTION INTRAVENOUS at 11:45

## 2024-01-08 RX ADMIN — PROPOFOL 150 MG: 10 INJECTION, EMULSION INTRAVENOUS at 13:25

## 2024-01-08 RX ADMIN — SODIUM CHLORIDE, POTASSIUM CHLORIDE, SODIUM LACTATE AND CALCIUM CHLORIDE: 600; 310; 30; 20 INJECTION, SOLUTION INTRAVENOUS at 14:09

## 2024-01-08 RX ADMIN — ROCURONIUM BROMIDE 10 MG: 10 INJECTION, SOLUTION INTRAVENOUS at 14:54

## 2024-01-08 RX ADMIN — ONDANSETRON 4 MG: 2 INJECTION INTRAMUSCULAR; INTRAVENOUS at 20:06

## 2024-01-08 RX ADMIN — FENTANYL CITRATE 150 MCG: 50 INJECTION, SOLUTION INTRAMUSCULAR; INTRAVENOUS at 13:25

## 2024-01-08 RX ADMIN — ONDANSETRON 4 MG: 2 INJECTION INTRAMUSCULAR; INTRAVENOUS at 15:16

## 2024-01-08 NOTE — OP NOTE
Preoperative diagnosis  Right renal mass    Postoperative diagnosis  Right renal mass    Procedure performed  Robotic assisted laparoscopic right radical nephrectomy    Attending surgeon  Genesis Suarez MD     Assistant  Jere Hernández RN    Anesthesia  General    EBL  20 mL    Complications  None    Specimen  Right kidney    Findings  Patent renal vein; palpable right upper pole renal mass; negative gross margins    Indications  73 y.o. female agreed to undergo the above named procedure after discussion of the alternatives, risks and benefits.   Informed consent was obtained.      Procedure  The patient properly identified, brought to the Operating Room. Following induction of general anesthesia, the patient was placed in the right flank position at the intersection to the twelfth rib. At the umbilicus, a small vertical incision was made with an 11 blade scalpel, through which we gained pneumoperitoneum with help of a Veress needle. A drop test was used to confirm its proper placement. Visual trocar with 0 degree laparoscopic lens was used to obtain access into the abdomen. There was no evidence of bowel or vascular injury. There were no adhesions noted and careful inspection was performed prior to insertion of the ports. In a triangular fashion towards the right upper quadrant, two 8 millimeter robot trocars were placed. In the mid-line just above the camera port, a 12 millimeter dilating assistant port was placed. We docked the robot using a 30 degree  lens. The adhesions were taken down. The colon was mobilized along the white line of Toldt. Once this was reflected, identification of the ureter and gonadal vein was achieved on the lower margin of the kidney. A space was created between posterior body wall and the posterior aspect of Gerota's fascia. Dissection continued toward the hilum. Once the renal hilum was identified, a window was created above and below. Vascular stapler was used to control the hilum.  Inspection noted hemostasis to be excellent. The kidney and was then circumferentially dissected until freed. It was then placed in an endocatch bag. Repeated inspection of the hilum, there was adequate hemostasis after observing for several minutes; the field was covered with Cathy hemostatic agent.  The robot was then undocked and the camera, 12  mm trocar excision was extended in order to extract the specimen.  The specimen was noted to be completely intact inside the EndoCatch bag.  This was passed off for pathology.  The fascia was closed in a running fashion with 0 Vicryl suture.  The skin was closed in subcuticular 4-0 Monocryl,mastisol and steristrips were placed as dressing.  The procedure was deemed terminated.  The patient tolerated the procedure well without complication and was  transferred  to the recovery room in stable condition.   All needle and sponge counts were correct x 2.      The first assist, Jere Hernández RN, was present and actively participated throughout the case.          Signed:  Genesis Suarez MD  01/08/24  15:58 EST

## 2024-01-08 NOTE — H&P
Psychiatric   HOSPITALIST HISTORY AND PHYSICAL  Date: 2024   Patient Name: Bisi Bhatt  : 1950  MRN: [id number]  Primary Care Physician:  Donnie Toney DO  Date of admission: 2024    Subjective   Subjective     Chief Complaint: Medical management, back pain    HPI:    Bisi Bhatt is a 73 y.o. female PMH chronic systolic CHF, HLD, history of CVA on DAPT, history of CKD stage III, HTN who was directly admitted after radical right nephrectomy due to 6 cm exophytic mass.  Postoperatively, she is not having much pain.  She still very sleepy from the anesthesia.  She denies chest pain or shortness of breath.  Hospital service was consulted for medical management.    Personal History     Past Medical History:  Past Medical History:   Diagnosis Date    Gout     Hyperlipidemia     Hypertension     Low back pain     PONV (postoperative nausea and vomiting)     Renal mass     Seasonal allergies     Stroke     ,  no residual follows with PCP    Swelling     rosanna. legs       Past Surgical History:  Past Surgical History:   Procedure Laterality Date    APPENDECTOMY      ARM TENDON REPAIR Right     bone shattered    CHOLECYSTECTOMY      COLONOSCOPY      HYSTERECTOMY      REPLACEMENT TOTAL KNEE Left 2013    TUBAL ABDOMINAL LIGATION         Family History:   History reviewed. No pertinent family history.     Social History:   Social History     Socioeconomic History    Marital status:    Tobacco Use    Smoking status: Never    Smokeless tobacco: Never   Vaping Use    Vaping Use: Never used   Substance and Sexual Activity    Alcohol use: Never    Drug use: Never    Sexual activity: Yes     Partners: Male     Birth control/protection: Hysterectomy        Home Medications:  NON FORMULARY, Zinc, allopurinol, aspirin, clopidogrel, fluticasone, furosemide, hydrALAZINE, loratadine, lovastatin, metoprolol succinate XL, montelukast, potassium chloride, spironolactone, trandolapril-verapamil, and  vitamin D    Allergies:  Allergies   Allergen Reactions    Penicillins Anaphylaxis    Percocet [Oxycodone-Acetaminophen] GI Intolerance       Review of Systems   A 14 point review of systems was obtained and otherwise negative unless stated in the HPI    Objective   Objective     Vitals:   Temp:  [97.8 °F (36.6 °C)-98.9 °F (37.2 °C)] 98.9 °F (37.2 °C)  Heart Rate:  [] 81  Resp:  [18-23] 21  BP: (113-140)/(45-76) 119/48  FiO2 (%):  [60 %] 60 %    Physical Exam    Constitutional: Awake, alert, no acute distress, sleepy but answering questions   HENT: NCAT, mucous membranes moist   Respiratory: Clear to auscultation bilaterally, nonlabored respirations    Cardiovascular: RRR, no MRG   Gastrointestinal: Positive bowel sounds, soft, nontender, nondistended   Musculoskeletal: No bilateral ankle edema, no clubbing or cyanosis to extremities   Psychiatric: Appropriate affect, cooperative   Neurologic: Oriented x 3, strength symmetric in all extremities, Cranial Nerves grossly intact to confrontation, speech clear   Skin: No rashes     Result Review    Result Review:  I have personally reviewed the results from the time of this admission to 1/8/2024 16:24 EST and agree with these findings:  [x]  Laboratory personally reviewed preoperative CMP, CBC  CBC          12/27/2023    12:59   CBC   WBC 12.98    RBC 4.79    Hemoglobin 14.1    Hematocrit 44.4    MCV 92.7    MCH 29.4    MCHC 31.8    RDW 15.1    Platelets 333      CMP          12/27/2023    12:59   CMP   Glucose 108    BUN 20    Creatinine 0.94    EGFR 63.8    Sodium 138    Potassium 3.7    Chloride 102    Calcium 9.2    Total Protein 6.5    Albumin 3.6    Globulin 2.9    Total Bilirubin 0.3    Alkaline Phosphatase 157    AST (SGOT) 10    ALT (SGPT) 7    Albumin/Globulin Ratio 1.2    BUN/Creatinine Ratio 21.3    Anion Gap 10.5      []  Microbiology  []  Radiology  [x]  EKG/Telemetry Telemetry personally reviewed  []  Cardiology/Vascular   []  Pathology  []  Old  records  []  Other:      Assessment & Plan   Assessment / Plan     Assessment/Plan:   6 cm exophytic mass of the right kidney status post radical nephrectomy  Hypertension  Hyperlipidemia  GERD  Seasonal allergic rhinitis  Chronic systolic CHF  HLD  Gout  History of CVA on DAPT  History of CKD stage III    Patient to be observed in the hospital overnight  Pain management ordered per urology  Blood pressures currently normal postoperatively, will hold metoprolol and antihypertensives, likely restart in a.m. based on blood pressure trend  Hold home diuretics spironolactone and Lasix overnight, consider restarting in a.m. based on renal function  Hold DAPT until cleared from a urology perspective  Restart home atorvastatin for hyperlipidemia   Restart home Singulair, substitute Zyrtec for home Claritin for seasonal allergic otitis  Oxygen if needed to keep sats greater than 90%  Baclofen as needed for muscle spasms  Monitor volume status closely and avoid volume overload in setting of CHF, currently appears euvolemic  Restart home allopurinol at discharge  Thank you for the consult, hospitalist service will follow  Trend renal function and electrolytes with a.m. BMP, magnesium   Trend Hgb and WBC with a.m. CBC    Discussed case with: Bedside RN, urology    DVT prophylaxis:  No DVT prophylaxis order currently exists.    Electronically signed by Ottoniel Satniago MD, 01/08/24, 4:24 PM EST.

## 2024-01-08 NOTE — ANESTHESIA PROCEDURE NOTES
Peripheral Block      Patient reassessed immediately prior to procedure    Patient location during procedure: pre-op  Start time: 1/8/2024 12:06 PM  Stop time: 1/8/2024 12:17 PM  Reason for block: at surgeon's request and post-op pain management  Performed by  Anesthesiologist: Mello Hanson MD  Preanesthetic Checklist  Completed: patient identified, IV checked, site marked, risks and benefits discussed, surgical consent, monitors and equipment checked, pre-op evaluation and timeout performed  Prep:  Pt Position: sitting  Sterile barriers:partial drape, alcohol skin prep, cap, washed/disinfected hands, gloves and mask  Prep: ChloraPrep  Patient monitoring: blood pressure monitoring, EKG and continuous pulse oximetry  Procedure    Sedation: yes    Guidance:landmark technique    ULTRASOUND INTERPRETATION.  Using ultrasound guidance a 22 G gauge needle was placed in close proximity to the nerve, at which point, under ultrasound guidance anesthetic was injected in the area of the nerve and spread of the anesthesia was seen on ultrasound in close proximity thereto.  There were no abnormalities seen on ultrasound; a digital image was taken; and the patient tolerated the procedure with no complications. Images:still images obtained, printed/placed on chart    Laterality:Bilateral  Block Type:erector spinae block  Injection Technique:single-shot  Needle Type:echogenic  Needle Gauge:22 G (2in)  Resistance on Injection: none    Medications Used: bupivacaine-EPINEPHrine PF (MARCAINE w/EPI) 0.25% -1:812595 injection - Injection   60 mL - 1/8/2024 12:06:00 PM      Medications  Comment:40 cc on RIGHT side first, then 20cc on left    Post Assessment  Injection Assessment: negative aspiration for heme, no paresthesia on injection and incremental injection  Patient Tolerance:comfortable throughout block  Complications:no  Additional Notes  The block or continuous infusion is requested by the referring physician for management of  postoperative pain, or pain related to a procedure. Ultrasound guidance (deemed medically necessary). Painless injection, pt was awake and conversant during the procedure without complications. Needle and surrounding structures visualized throughout procedure. No adverse reactions or complications seen during this period. Post-procedure image showed no signs of complication, and anatomy was consistent with an uncomplicated nerve blockade.

## 2024-01-08 NOTE — ANESTHESIA PREPROCEDURE EVALUATION
Anesthesia Evaluation     Patient summary reviewed and Nursing notes reviewed   history of anesthetic complications:  PONV  NPO Solid Status: > 8 hours  NPO Liquid Status: > 2 hours           Airway   Mallampati: II  TM distance: >3 FB  Neck ROM: full  No difficulty expected  Dental      Pulmonary - normal exam    breath sounds clear to auscultation  (+) ,shortness of breath  Cardiovascular - normal exam  Exercise tolerance: good (4-7 METS)    Rhythm: regular  Rate: normal    (+) hypertension, CHF , hyperlipidemia      Neuro/Psych  (+) CVA  GI/Hepatic/Renal/Endo - negative ROS     Musculoskeletal (-) negative ROS    Abdominal    Substance History - negative use     OB/GYN negative ob/gyn ROS         Other - negative ROS       ROS/Med Hx Other: <4METS, SOAE. HX HTN,CVA >10 YRS, NO RESIDUALS. WBC 12.98. KT               Anesthesia Plan    ASA 3     general and general with block     (Patient understands anesthesia not responsible for dental damage.  Consented for esb block , pt wants to be heavily sedated for block)  intravenous induction     Anesthetic plan, risks, benefits, and alternatives have been provided, discussed and informed consent has been obtained with: patient.  Pre-procedure education provided  Use of blood products discussed with patient .    Plan discussed with CRNA.    CODE STATUS:

## 2024-01-08 NOTE — H&P
[facility]   Urology Preop H&P Note    Patient Name: Bisi Bhatt  : 1950  MRN: [id number]  Primary Care Physician:  Donnie Toney DO  Referring Physician: Donnie Toney DO  Date of admission: 2024    Subjective   Subjective     Reason for Consult/ Chief Complaint: Renal mass, right [N28.89]    HPI:  Bisi Bhatt is a 73 y.o. female history ofRenal mass, right [N28.89] who presents for further management OR.  Presents for planned Procedure(s):  NEPHRECTOMY LAPAROSCOPIC WITH DAVINCI ROBOT-RIGHT;  .  Procedure to be preformed on the right.  Risk, benefits, and alternatives discussed with patient prior to today. Risks, benefits, alternatives discussed at length.  These include but are not limited to bleeding, infection, pain, damage to surrounding structures, need for further procedures, irreversible loss of renal function, residual tumor, or cancer recurrence, benign disease.  Patient also aware that diagnosis remains unclear until pathologic evaluation is complete.  Also aware that this is a procedure performed under anesthesia which has inherent risks including up to death.  Patient notes understanding of these risks, participated in the discussion, and asked appropriate questions, and she is agreeable. All questions were addressed after providing time for discussion.  Patient denies significant changes since last visit.  No new complaints today.      _________  CT abdomen pelvis wwo contrast 10/9/2023: 6 cm exophytic round mass in the upper pole the right kidney of soft tissue attenuation (22 Hounsfield units) demonstrates contrast enhancement (62 Hounsfield units) consistent with a solid mass worrisome for renal cell carcinoma. No tumor thrombus within the right renal vein or inferior vena cava. No adjacent retroperitoneal lymphadenopathy. 4 cm exophytic cyst of the posterior cortex midsection of left kidney.      Creatinine 2023: 1.1       Review of Systems   All systems were  reviewed and negative except for the above  Personal History     Past Medical History:   Diagnosis Date    Gout     Hyperlipidemia     Hypertension     Low back pain     PONV (postoperative nausea and vomiting)     Renal mass     Seasonal allergies     Stroke     2002, 2005 no residual follows with PCP    Swelling     rosanna. legs       Past Surgical History:   Procedure Laterality Date    APPENDECTOMY      ARM TENDON REPAIR Right     bone shattered    CHOLECYSTECTOMY      COLONOSCOPY      HYSTERECTOMY      REPLACEMENT TOTAL KNEE Left 2013    TUBAL ABDOMINAL LIGATION         Family History: family history is not on file. Otherwise pertinent FHx was reviewed and not pertinent to current issue.    Social History:  reports that she has never smoked. She has never used smokeless tobacco. She reports that she does not drink alcohol and does not use drugs.    Home Medications:  NON FORMULARY, Zinc, allopurinol, aspirin, clopidogrel, fluticasone, furosemide, hydrALAZINE, loratadine, lovastatin, metoprolol succinate XL, montelukast, potassium chloride, spironolactone, trandolapril-verapamil, and vitamin D    Allergies:  Allergies   Allergen Reactions    Penicillins Anaphylaxis    Percocet [Oxycodone-Acetaminophen] GI Intolerance       Objective    Objective     Vitals:   Temp:  [97.8 °F (36.6 °C)] 97.8 °F (36.6 °C)  Heart Rate:  [77] 77  Resp:  [18] 18  BP: (138)/(62) 138/62    Physical Exam:   Constitutional: Awake, alert   Respiratory: Clear, nonlabored respirations    Cardiovascular: Regular rate, no chest retractions   gastrointestinal: Appears soft, nontender     Results:    Assessment & Plan   Assessment / Plan     Brief Patient Summary:  Bisi Bhatt is a 73 y.o. female who     Active Hospital Problems:  Active Hospital Problems    Diagnosis     **Renal mass, right        Plan:   Proceed to the OR for planned procedure, Procedure(s):  NEPHRECTOMY LAPAROSCOPIC WITH DAVINCI ROBOT-RIGHT,  ,   Risk, benefits, and  alternatives discussed with patient at length she is agreeable to proceed  Laterality identified, right  All questions addressed      Electronically signed by Genesis Suarez MD, 01/08/24, 11:44 AM EST.

## 2024-01-08 NOTE — ANESTHESIA POSTPROCEDURE EVALUATION
Patient: Bisi Bhatt    Procedure Summary       Date: 01/08/24 Room / Location: [facility] OR 08 / [facility] MAIN OR    Anesthesia Start: 1316 Anesthesia Stop: 1609    Procedure: NEPHRECTOMY LAPAROSCOPIC WITH DAVINCI ROBOT-RIGHT (Right: Abdomen) Diagnosis:       Renal mass, right      (Renal mass, right [N28.89])    Surgeons: Genesis Suarez MD Provider: Evan Millan CRNA    Anesthesia Type: general, general with block ASA Status: 3            Anesthesia Type: general, general with block    Vitals  Vitals Value Taken Time   /56 01/08/24 1703   Temp 36.1 °C (97 °F) 01/08/24 1605   Pulse 60 01/08/24 1706   Resp 12 01/08/24 1640   SpO2 94 % 01/08/24 1706   Vitals shown include unfiled device data.        Post Anesthesia Care and Evaluation    Patient location during evaluation: bedside  Patient participation: complete - patient participated  Level of consciousness: awake  Pain score: 0  Pain management: adequate    Airway patency: patent  PONV Status: none  Cardiovascular status: acceptable and stable  Respiratory status: acceptable  Hydration status: acceptable    Comments: An Anesthesiologist personally participated in the most demanding procedures (including induction and emergence if applicable) in the anesthesia plan, monitored the course of anesthesia administration at frequent intervals and remained physically present and available for immediate diagnosis and treatment of emergencies.

## 2024-01-09 VITALS
BODY MASS INDEX: 37.56 KG/M2 | WEIGHT: 186.29 LBS | TEMPERATURE: 97.7 F | HEIGHT: 59 IN | DIASTOLIC BLOOD PRESSURE: 55 MMHG | RESPIRATION RATE: 16 BRPM | SYSTOLIC BLOOD PRESSURE: 132 MMHG | HEART RATE: 57 BPM | OXYGEN SATURATION: 98 %

## 2024-01-09 LAB
ALBUMIN SERPL-MCNC: 3.2 G/DL (ref 3.5–5.2)
ALBUMIN/GLOB SERPL: 1.2 G/DL
ALP SERPL-CCNC: 133 U/L (ref 39–117)
ALT SERPL W P-5'-P-CCNC: 16 U/L (ref 1–33)
ANION GAP SERPL CALCULATED.3IONS-SCNC: 12.6 MMOL/L (ref 5–15)
AST SERPL-CCNC: 21 U/L (ref 1–32)
BASOPHILS # BLD AUTO: 0.02 10*3/MM3 (ref 0–0.2)
BASOPHILS NFR BLD AUTO: 0.1 % (ref 0–1.5)
BILIRUB SERPL-MCNC: 0.2 MG/DL (ref 0–1.2)
BUN SERPL-MCNC: 16 MG/DL (ref 8–23)
BUN/CREAT SERPL: 11.9 (ref 7–25)
CALCIUM SPEC-SCNC: 8.9 MG/DL (ref 8.6–10.5)
CHLORIDE SERPL-SCNC: 103 MMOL/L (ref 98–107)
CO2 SERPL-SCNC: 20.4 MMOL/L (ref 22–29)
CREAT SERPL-MCNC: 1.34 MG/DL (ref 0.57–1)
DEPRECATED RDW RBC AUTO: 50.9 FL (ref 37–54)
EGFRCR SERPLBLD CKD-EPI 2021: 41.7 ML/MIN/1.73
EOSINOPHIL # BLD AUTO: 0 10*3/MM3 (ref 0–0.4)
EOSINOPHIL NFR BLD AUTO: 0 % (ref 0.3–6.2)
ERYTHROCYTE [DISTWIDTH] IN BLOOD BY AUTOMATED COUNT: 14.7 % (ref 12.3–15.4)
GLOBULIN UR ELPH-MCNC: 2.7 GM/DL
GLUCOSE SERPL-MCNC: 169 MG/DL (ref 65–99)
HCT VFR BLD AUTO: 40.7 % (ref 34–46.6)
HGB BLD-MCNC: 12.9 G/DL (ref 12–15.9)
IMM GRANULOCYTES # BLD AUTO: 0.07 10*3/MM3 (ref 0–0.05)
IMM GRANULOCYTES NFR BLD AUTO: 0.5 % (ref 0–0.5)
LYMPHOCYTES # BLD AUTO: 0.96 10*3/MM3 (ref 0.7–3.1)
LYMPHOCYTES NFR BLD AUTO: 7 % (ref 19.6–45.3)
MAGNESIUM SERPL-MCNC: 2 MG/DL (ref 1.6–2.4)
MCH RBC QN AUTO: 29.7 PG (ref 26.6–33)
MCHC RBC AUTO-ENTMCNC: 31.7 G/DL (ref 31.5–35.7)
MCV RBC AUTO: 93.8 FL (ref 79–97)
MONOCYTES # BLD AUTO: 0.41 10*3/MM3 (ref 0.1–0.9)
MONOCYTES NFR BLD AUTO: 3 % (ref 5–12)
NEUTROPHILS NFR BLD AUTO: 12.24 10*3/MM3 (ref 1.7–7)
NEUTROPHILS NFR BLD AUTO: 89.4 % (ref 42.7–76)
NRBC BLD AUTO-RTO: 0 /100 WBC (ref 0–0.2)
PHOSPHATE SERPL-MCNC: 3 MG/DL (ref 2.5–4.5)
PLATELET # BLD AUTO: 303 10*3/MM3 (ref 140–450)
PMV BLD AUTO: 11 FL (ref 6–12)
POTASSIUM SERPL-SCNC: 4.8 MMOL/L (ref 3.5–5.2)
PROT SERPL-MCNC: 5.9 G/DL (ref 6–8.5)
RBC # BLD AUTO: 4.34 10*6/MM3 (ref 3.77–5.28)
SODIUM SERPL-SCNC: 136 MMOL/L (ref 136–145)
WBC NRBC COR # BLD AUTO: 13.7 10*3/MM3 (ref 3.4–10.8)

## 2024-01-09 PROCEDURE — 94799 UNLISTED PULMONARY SVC/PX: CPT

## 2024-01-09 PROCEDURE — 25810000003 LACTATED RINGERS PER 1000 ML: Performed by: UROLOGY

## 2024-01-09 PROCEDURE — 80053 COMPREHEN METABOLIC PANEL: CPT | Performed by: INTERNAL MEDICINE

## 2024-01-09 PROCEDURE — 25010000002 MORPHINE PER 10 MG: Performed by: UROLOGY

## 2024-01-09 PROCEDURE — 83735 ASSAY OF MAGNESIUM: CPT | Performed by: INTERNAL MEDICINE

## 2024-01-09 PROCEDURE — 84100 ASSAY OF PHOSPHORUS: CPT | Performed by: INTERNAL MEDICINE

## 2024-01-09 PROCEDURE — 85025 COMPLETE CBC W/AUTO DIFF WBC: CPT | Performed by: INTERNAL MEDICINE

## 2024-01-09 RX ORDER — CLOPIDOGREL BISULFATE 75 MG/1
75 TABLET ORAL DAILY
Start: 2024-01-12

## 2024-01-09 RX ORDER — PSEUDOEPHEDRINE HCL 30 MG
100 TABLET ORAL 2 TIMES DAILY
Qty: 30 CAPSULE | Refills: 0 | Status: SHIPPED | OUTPATIENT
Start: 2024-01-09

## 2024-01-09 RX ORDER — HYDROCODONE BITARTRATE AND ACETAMINOPHEN 5; 325 MG/1; MG/1
1-2 TABLET ORAL EVERY 4 HOURS PRN
Qty: 16 TABLET | Refills: 0 | Status: SHIPPED | OUTPATIENT
Start: 2024-01-09

## 2024-01-09 RX ORDER — ASPIRIN 81 MG/1
81 TABLET, CHEWABLE ORAL DAILY
Start: 2024-01-10

## 2024-01-09 RX ADMIN — Medication 10 ML: at 09:17

## 2024-01-09 RX ADMIN — SODIUM CHLORIDE, POTASSIUM CHLORIDE, SODIUM LACTATE AND CALCIUM CHLORIDE 100 ML/HR: 600; 310; 30; 20 INJECTION, SOLUTION INTRAVENOUS at 00:24

## 2024-01-09 RX ADMIN — MONTELUKAST 10 MG: 10 TABLET, FILM COATED ORAL at 09:17

## 2024-01-09 RX ADMIN — HYDROCODONE BITARTRATE AND ACETAMINOPHEN 1 TABLET: 10; 325 TABLET ORAL at 11:06

## 2024-01-09 RX ADMIN — MORPHINE SULFATE 2 MG: 2 INJECTION, SOLUTION INTRAMUSCULAR; INTRAVENOUS at 03:55

## 2024-01-09 RX ADMIN — CETIRIZINE HYDROCHLORIDE 10 MG: 10 TABLET, FILM COATED ORAL at 09:17

## 2024-01-09 NOTE — PLAN OF CARE
Goal Outcome Evaluation:   Pt being discharged home.  Sahara Castellanos RN

## 2024-01-09 NOTE — DISCHARGE SUMMARY
[facility]   DISCHARGE SUMMARY      Name:  Bisi Bhatt   Age:  73 y.o.  Sex:  female  :  1950  MRN:  [id number]   Visit Number:  [id number]  Primary Care Physician:  Donnie Toney DO  Date of Discharge:  2024  Admission Date:  2024      Discharge Diagnosis:       Active Hospital Problems    Diagnosis  POA    **Renal mass, right [N28.89]  Yes    Right renal mass [N28.89]  Yes      Resolved Hospital Problems   No resolved problems to display.         Presenting Problem/History of Present Illness:    Renal mass, right [N28.89]  Right renal mass [N28.89]         Hospital Course:    Patient underwent the below procedure.  She tolerated procedure well.  Please see operative report for further details.  Patient was admitted postoperatively for pain control and monitoring.  Hospitalist service followed for medical management; see consult and notes for details.  Patient remained stable during admission.  Postoperatively she was able to ambulate as well as tolerate a diet.  At time of discharge patient was ambulating without assist, tolerating regular diet, and pain was controlled with p.o. medications.  At this time all goals of inpatient care meant patient is deemed ready for discharge home     Procedures Performed:    Procedure(s):  NEPHRECTOMY LAPAROSCOPIC WITH DAVINCI ROBOT-RIGHT       Consults:     Consults       Date and Time Order Name Status Description    2024  4:08 PM Inpatient Hospitalist Consult              Pertinent Test Results:     Lab Results (all)       Procedure Component Value Units Date/Time    Comprehensive Metabolic Panel [[id number]]  (Abnormal) Collected: 24 0411    Specimen: Blood from Hand, Right Updated: 24 0550     Glucose 169 mg/dL      BUN 16 mg/dL      Creatinine 1.34 mg/dL      Sodium 136 mmol/L      Potassium 4.8 mmol/L      Chloride 103 mmol/L      CO2 20.4 mmol/L      Calcium 8.9 mg/dL      Total Protein 5.9 g/dL      Albumin 3.2 g/dL      ALT  (SGPT) 16 U/L      AST (SGOT) 21 U/L      Alkaline Phosphatase 133 U/L      Total Bilirubin 0.2 mg/dL      Globulin 2.7 gm/dL      A/G Ratio 1.2 g/dL      BUN/Creatinine Ratio 11.9     Anion Gap 12.6 mmol/L      eGFR 41.7 mL/min/1.73     Narrative:      GFR Normal >60  Chronic Kidney Disease <60  Kidney Failure <15    The GFR formula is only valid for adults with stable renal function between ages 18 and 70.    Magnesium [[id number]]  (Normal) Collected: 01/09/24 0411    Specimen: Blood from Hand, Right Updated: 01/09/24 0550     Magnesium 2.0 mg/dL     Phosphorus [[id number]]  (Normal) Collected: 01/09/24 0411    Specimen: Blood from Hand, Right Updated: 01/09/24 0550     Phosphorus 3.0 mg/dL     CBC & Differential [[id number]]  (Abnormal) Collected: 01/09/24 0411    Specimen: Blood from Hand, Right Updated: 01/09/24 0513    Narrative:      The following orders were created for panel order CBC & Differential.  Procedure                               Abnormality         Status                     ---------                               -----------         ------                     CBC Auto Differential[[id number]]        Abnormal            Final result                 Please view results for these tests on the individual orders.    CBC Auto Differential [[id number]]  (Abnormal) Collected: 01/09/24 0411    Specimen: Blood from Hand, Right Updated: 01/09/24 0513     WBC 13.70 10*3/mm3      RBC 4.34 10*6/mm3      Hemoglobin 12.9 g/dL      Hematocrit 40.7 %      MCV 93.8 fL      MCH 29.7 pg      MCHC 31.7 g/dL      RDW 14.7 %      RDW-SD 50.9 fl      MPV 11.0 fL      Platelets 303 10*3/mm3      Neutrophil % 89.4 %      Lymphocyte % 7.0 %      Monocyte % 3.0 %      Eosinophil % 0.0 %      Basophil % 0.1 %      Immature Grans % 0.5 %      Neutrophils, Absolute 12.24 10*3/mm3      Lymphocytes, Absolute 0.96 10*3/mm3      Monocytes, Absolute 0.41 10*3/mm3      Eosinophils, Absolute 0.00 10*3/mm3      Basophils, Absolute 0.02  "10*3/mm3      Immature Grans, Absolute 0.07 10*3/mm3      nRBC 0.0 /100 WBC             Imaging Results (All)       None            Condition on Discharge:      good    Vital Signs:    /58 (BP Location: Left arm, Patient Position: Lying)   Pulse 55   Temp 97.9 °F (36.6 °C) (Oral)   Resp 16   Ht 149.9 cm (59\")   Wt 84.5 kg (186 lb 4.6 oz)   SpO2 96%   BMI 37.63 kg/m²     Discharge Disposition:        Discharge Medication:       Discharge Medications        New Medications        Instructions Start Date   docusate sodium 100 MG capsule   100 mg, Oral, 2 Times Daily      HYDROcodone-acetaminophen 5-325 MG per tablet  Commonly known as: NORCO   1-2 tablets, Oral, Every 4 Hours PRN             Changes to Medications        Instructions Start Date   aspirin 81 MG chewable tablet  What changed: See the new instructions.   81 mg, Oral, Daily   Start Date: January 10, 2024     clopidogrel 75 MG tablet  Commonly known as: Plavix  What changed:   See the new instructions.  These instructions start on January 12, 2024. If you are unsure what to do until then, ask your doctor or other care provider.   75 mg, Oral, Daily   Start Date: January 12, 2024            Continue These Medications        Instructions Start Date   allopurinol 300 MG tablet  Commonly known as: ZYLOPRIM   Take 1 tablet by mouth Daily.      fluticasone 50 MCG/ACT nasal spray  Commonly known as: FLONASE   2 sprays, Nasal, Daily PRN      hydrALAZINE 25 MG tablet  Commonly known as: APRESOLINE   Take 1 tablet by mouth Every Night.      Lasix 40 MG tablet  Generic drug: furosemide   Take 1.5 tablets by mouth Every Night.      loratadine 10 MG tablet  Commonly known as: CLARITIN   10 mg, Oral, Daily      lovastatin 20 MG tablet  Commonly known as: MEVACOR   Take 1 tablet by mouth Every Night.      NON FORMULARY   GOLO Release      potassium chloride 10 MEQ CR tablet   10 mEq, Oral, Daily      Singulair 10 MG tablet  Generic drug: montelukast   Take 1 " tablet by mouth Daily.      spironolactone 25 MG tablet  Commonly known as: ALDACTONE   spironolactone 25 mg oral tablet take 1 tablet (25 mg) by oral route every other day   Active      Tarka 2-180 MG per CR tablet  Generic drug: trandolapril-verapamil   Tarka 2-180 mg oral tablet, IR - ER, biphasic 24hr take 1 tablet by oral route once daily with food   Active      Toprol XL 50 MG 24 hr tablet  Generic drug: metoprolol succinate XL   Take 1 tablet by mouth Every Night.      vitamin D 1.25 MG (66393 UT) capsule capsule  Commonly known as: ERGOCALCIFEROL   Take 1 capsule by mouth Every 7 (Seven) Days. Friday      Zinc 50 MG tablet   50 mg, Oral, Daily               Discharge Diet:         Activity at Discharge:     Activity Instructions       Discharge Activity      Activity: You will likely feel tired for 1-2 weeks or longer after surgery.  Simple tasks may tire you.  We encourage you to nap during the day.  Your activity and energy will improve each day.     Try to walk as much as tolerated. It is ok to go up and down stairs as tolerated. Walk at least twice a day for 20 minutes at a time.  Start the day you leave the hospital and continue for 5 weeks after surgery.     Do not drive while taking prescription pain medication (narcotic).       Do not lift anything over 15 pounds until follow up.  15 pounds is about a gallon of milk.     Caring for your wound: Your wound may itch or feel a bit irritated.  It is normal to feel a firm ridge under the wound as it heals.  The scar will get smaller and slowly fade in 6-18 months. All of the stiches are dissovable and the surgical glue will come off over time.     You may shower.  Do not soak in a tub, pool or Jacuzzi for 4 weeks.     Recommend wearing loose pants with an elastic waistband to feel more comfortable.     Eating and drinking:  You may have less of an appetite right after surgery.  Your appetite should steadily improve.  Drinking is more important than eating.   Be sure to drink plenty of fluids and stay hydrated.     Medications:   Follow the instructions provided to you at time of discharge regarding your home medications  Your pain should improve each day after surgery.  Take the prescription pain medication for severe pain as needed.  As your pain improves, it is important that you decrease the amount of prescription pain medicine as much as possible. Prescription pain medication is unable to be refilled over the phone.     Do not take additional Tylenol while taking the prescription pain medication.  It is okay to take ibuprofen while taking the prescription pain medication as needed for additional discomfort.  Once no longer taking prescription pain medicine, you may alternate between ibuprofen and Tylenol as needed.     Many patients experience constipation after surgery, which can cause significant discomfort. It may take up to a week to have a bowel movement. You should take a stool softener (Colace or Docusate) twice a day and should add Miralax once daily, if needed, until you are having bowel movements regularly.     Follow-up: You will be scheduled for follow-up next week       Notify our office if any of the following occurs:  Increased pain, redness, or swelling at the incision  Fever greater than 101.5°F  Persistent nausea, vomiting or inability to have bowel movements  Shortness of breath, calf pain, or swelling in your extremities            Follow-up Appointments:    No future appointments.  Additional Instructions for the Follow-ups that You Need to Schedule       Discharge Follow-up with Specified Provider: Dr. Suarez; 2 Weeks   As directed      To: Dr. Suarez   Follow Up: 2 Weeks   Follow Up Details: [phone]        Basic Metabolic Panel    Jan 16, 2024 (Approximate)      Release to patient: Routine Release        CBC (No Diff)    Jan 16, 2024 (Approximate)      Release to patient: Routine Release                Test Results Pending at  Discharge:    Pending Labs       Order Current Status    Tissue Pathology Exam Collected (01/08/24 9604)               Genesis Suarez MD  01/09/24  08:39 EST

## 2024-01-09 NOTE — SIGNIFICANT NOTE
01/09/24 8997   Plan   Plan ROXANNE RN met with patient at bedside.  Patient reports lives with spouse that can provide support if needed.  Provides own IADL's.  Reports no financial needs.  Facesheet verified.  Patient is agreeable to use [facility].  Plans to return home with spouse and no needs at this time.

## 2024-01-10 ENCOUNTER — READMISSION MANAGEMENT (OUTPATIENT)
Dept: CALL CENTER | Facility: HOSPITAL | Age: 74
End: 2024-01-10
Payer: MEDICARE

## 2024-01-10 NOTE — OUTREACH NOTE
Prep Survey      Flowsheet Row Responses   [facility]? [facility]   Is LACE score < 7 ? No   Eligibility Readm Mgmt   Discharge diagnosis NEPHRECTOMY LAPAROSCOPIC WITH DAVINCI ROBOT-RIGHT   Does the patient have one of the following disease processes/diagnoses(primary or secondary)? General Surgery   Does the patient have Home health ordered? No   Is there a DME ordered? No   Prep survey completed? Yes            Vicki BOYD - Registered Nurse

## 2024-01-11 ENCOUNTER — TELEPHONE (OUTPATIENT)
Dept: UROLOGY | Facility: CLINIC | Age: 74
End: 2024-01-11
Payer: MEDICARE

## 2024-01-11 NOTE — TELEPHONE ENCOUNTER
Caller: MCKAYLA FLORES    Relationship to patient: SELF    Best call back number: [phone]     Patient is needing: PLEASE FORWARD THE TWO LAB ORDER FROM DR SAUER TO [place] [phone].  PT WILL GO ON/A 1/16/24

## 2024-01-12 ENCOUNTER — TELEPHONE (OUTPATIENT)
Dept: UROLOGY | Facility: CLINIC | Age: 74
End: 2024-01-12
Payer: MEDICARE

## 2024-01-12 LAB
CYTO UR: NORMAL
LAB AP CASE REPORT: NORMAL
LAB AP CLINICAL INFORMATION: NORMAL
LAB AP SPECIAL STAINS: NORMAL
LAB AP SYNOPTIC CHECKLIST: NORMAL
PATH REPORT.FINAL DX SPEC: NORMAL
PATH REPORT.GROSS SPEC: NORMAL

## 2024-01-16 ENCOUNTER — TELEPHONE (OUTPATIENT)
Dept: UROLOGY | Facility: CLINIC | Age: 74
End: 2024-01-16
Payer: MEDICARE

## 2024-01-16 DIAGNOSIS — N28.89 RIGHT RENAL MASS: ICD-10-CM

## 2024-01-16 NOTE — TELEPHONE ENCOUNTER
PATIENT CALLED AND ASKED TO SPEAK TO DR. CROW TO GET PATHOLOGY RESULTS.    SHE SAID SHE FOUND THE PATHOLOGY ON MY CHART, AND IT SAID THE MASS WAS CANCEROUS.    #[phone]

## 2024-01-17 NOTE — PROGRESS NOTES
"Enter Query Response Below      Query Response: yes    Electronically signed by Genesis Suarez MD, 24, 1:12 PM EST.               If applicable, please update the problem list.   Patient: Bisi Bhatt        : 1950  Account: [id number]           Admit Date: 2024        How to Respond to this query:       a. Click New Note     b. Answer query within the yellow box.                c. Update the Problem List, if applicable.      If you have any questions about this query contact me at: [email or web address]     :    Based on inpatient coding guidelines, [facility] are not allowed to use diagnoses from pathology reports as the sole basis for billing.  Any findings noted on a pathology report must be affirmed by the treating physician before billing.     73 year old patient with exophytic right renal mass admitted 2024 for right radical nephrectomy.    The pathologist reported that the specimen shows:  \"Final Diagnosis  Right kidney, nephrectomy:               - Papillary renal cell carcinoma\".     Is this finding consistent with your clinical impression and treatment plan for this patient?    Yes  No  Other explanation for clinical impression and treatment plan_________  Unable to determine    By submitting this query, we are merely seeking further clarification of documentation to accurately reflect all conditions that you are monitoring, evaluating, treating or that extend the hospitalization or utilize additional resources of care. Please utilize your independent clinical judgment when addressing the question(s) above.     This query and your response, once completed, will be entered into the legal medical record.    Sincerely,  Roro Crespo RN, BSN, CCDS  Clinical Documentation Integrity Program   [email or web address]  " none

## 2024-01-19 ENCOUNTER — READMISSION MANAGEMENT (OUTPATIENT)
Dept: CALL CENTER | Facility: HOSPITAL | Age: 74
End: 2024-01-19
Payer: MEDICARE

## 2024-01-19 NOTE — OUTREACH NOTE
General Surgery Week 2 Survey      Flowsheet Row Responses   [facility] patient discharged from? [facility]   Does the patient have one of the following disease processes/diagnoses(primary or secondary)? General Surgery   Week 2 attempt successful? Yes   Call start time 1243   Call end time 1246   Discharge diagnosis NEPHRECTOMY LAPAROSCOPIC WITH DAVINCI ROBOT-RIGHT   Meds reviewed with patient/caregiver? Yes   Is the patient having any side effects they believe may be caused by any medication additions or changes? No   Does the patient have all medications related to this admission filled (includes all antibiotics, pain medications, etc.) Yes   Is the patient taking all medications as directed (includes completed medication regime)? Yes   Does the patient have a follow up appointment scheduled with their surgeon? Yes   Has the patient kept scheduled appointments due by today? N/A   Comments aptt wednesday   Psychosocial issues? No   Did the patient receive a copy of their discharge instructions? Yes   Nursing interventions Reviewed instructions with patient   What is the patient's perception of their health status since discharge? Improving   Nursing interventions Nurse provided patient education   Is the patient /caregiver able to teach back basic post-op care? Do not remove steri-strips, Keep incision areas clean,dry and protected, Take showers only when approved by MD-sponge bathsmith until then, Continue use of incentive spirometry at least 1 week post discharge   Is the patient/caregiver able to teach back signs and symptoms of incisional infection? Increased redness, swelling or pain at the incisonal site, Increased drainage or bleeding, Incisional warmth, Pus or odor from incision, Fever   Is the patient/caregiver able to teach back steps to recovery at home? Set small, achievable goals for return to baseline health, Make a list of questions for surgeon's appointment   If the patient is a current smoker, are  they able to teach back resources for cessation? Not a smoker   Is the patient/caregiver able to teach back the hierarchy of who to call/visit for symptoms/problems? PCP, Specialist, Home health nurse, Urgent Care, ED, 911 Yes   Week 2 call completed? Yes   Graduated Yes   Is the patient interested in additional calls from an ambulatory ? No   Would this patient benefit from a Referral to [facility] Social Work? No   Wrap up additional comments pt stated she is doing wonderful, only took one pain pill and has taken tylenol after that if needed.   Call end time 1246            ROSA M JARQUIN - Registered Nurse

## 2024-01-24 ENCOUNTER — OFFICE VISIT (OUTPATIENT)
Dept: UROLOGY | Facility: CLINIC | Age: 74
End: 2024-01-24
Payer: MEDICARE

## 2024-01-24 VITALS
HEIGHT: 59 IN | BODY MASS INDEX: 36.69 KG/M2 | DIASTOLIC BLOOD PRESSURE: 81 MMHG | SYSTOLIC BLOOD PRESSURE: 137 MMHG | TEMPERATURE: 98.1 F | WEIGHT: 182 LBS

## 2024-01-24 DIAGNOSIS — C64.1 RENAL CELL CARCINOMA OF RIGHT KIDNEY: Primary | ICD-10-CM

## 2024-01-24 PROCEDURE — 99024 POSTOP FOLLOW-UP VISIT: CPT | Performed by: UROLOGY

## 2024-01-30 NOTE — PROGRESS NOTES
"    UROLOGY OFFICE H&P NOTE    Subjective   HPI  Bisi Bhatt is a 73 y.o. female.  Presents for postop visit after right radical nephrectomy 1/8/2024.  Patient states she is doing well.  Denies pain.  No specific concerns.  States PCP is monitoring her kidney function; has recheck next week.      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 No tumor thrombus within the right renal vein or inferior vena cava. No adjacent retroperitoneal lymphadenopathy. 4 cm exophytic cyst of the posterior cortex midsection of left kidney.     Creatinine   1/18/2024: 1.7  1/15/2024: 1.9  1/9/2024: 1.34  12/27/2023: 0.94  9/27/2023: 1.1    Review of systems  A review of systems was performed, and positive findings are noted in the HPI.    Objective     Vital Signs:   /81   Temp 98.1 °F (36.7 °C)   Ht 149.9 cm (59\")   Wt 82.6 kg (182 lb)   BMI 36.76 kg/m²       Physical exam  No acute distress, well-nourished  Abdomen soft, nondistended, nontender; incisions healing well, some ecchymoses   awake alert and oriented  Mood normal; affect normal        Problem List:  Patient Active Problem List   Diagnosis    Bowel disease    Congestive heart failure    High blood pressure    High cholesterol    Shortness of breath    Stroke    Urinary incontinence, urge    Vaginal atrophy    Acute cystitis without hematuria    Renal mass, right    Right renal mass               Assessment & Plan   Diagnoses and all orders for this visit:    1. Renal cell carcinoma of right kidney (Primary)  -     CT Abdomen With & Without Contrast; " Future      Doing well status post right ankle nephrectomy  Patient continue activity restrictions over the next couple weeks; then may slowly increase activity as tolerated    Pathology discussed with patient at length; pT1b papillary renal cell carcinoma; has some histologic findings consistent with aggressiveness; fortunately margin negative and will have low risk of recurrence given that she had radical nephrectomy  Given the aggressive histologic findings, plan for close interval monitoring; for CT scan at 3 months and then per NCCN guidelines    Continue monitoring renal function; believe patient still to be healing from surgery    Follow-up in about 3 months, CT scan prior  All questions addressed

## 2024-04-29 ENCOUNTER — TELEPHONE (OUTPATIENT)
Dept: UROLOGY | Facility: CLINIC | Age: 74
End: 2024-04-29
Payer: MEDICARE

## 2024-04-29 NOTE — TELEPHONE ENCOUNTER
Patient went to [place] and her creatine and gfr was low.  She does not have a follow up but could make her one.  How would you like to proceed.

## 2024-04-29 NOTE — TELEPHONE ENCOUNTER
Caller: MCKAYLA FLORES    Relationship to patient: SELF    Best call back number: [phone]    Patient is needing: PATIENT CALLED IN STATING WHEN SHE WENT FOR HER CT SCAN, THEY DID BLOOD WORK AND SAID HER NUMBERS WERE NOT HIGH ENOUGH FOR THE SCAN TO BE COMPLETED WITH CONTRAST. SO THEY DIDN'T DO THE SCAN AND SHE WANTED TO KNOW WHAT DR CROW WANTED HER TO DO FROM HERE. PLEASE CALL PATIENT BACK FOR ASSISTANCE.

## 2024-05-02 NOTE — TELEPHONE ENCOUNTER
Patient called back, she stated that she had called but has not heard back. Patient would patti a call back to see how Dr Suarez wants to proceed

## 2024-05-07 DIAGNOSIS — C64.1 RENAL CELL CARCINOMA OF RIGHT KIDNEY: Primary | ICD-10-CM

## 2024-05-15 ENCOUNTER — TELEPHONE (OUTPATIENT)
Dept: UROLOGY | Facility: CLINIC | Age: 74
End: 2024-05-15
Payer: MEDICARE

## 2024-05-15 NOTE — TELEPHONE ENCOUNTER
PER DR SAUER, PT NEPHROLOGIST, DR CARVER, WAS TO ARRANGE ALL OF THIS FOR PT. SHE HAS DEFERRED ALL THIS TO HIM. SHE SPOKE TO DR DIRECTLY REGARDING THIS.

## 2024-05-15 NOTE — TELEPHONE ENCOUNTER
SPOKE TO PT AND ADVISED HER THAT SHE NEEDS TO SPEAK TO DR NASCIMENTO'S OFFICE REGARDING THIS. ADVISED HER THAT DR SAUER SPOKE TO HIM TODAY AND WE ARE DEFERRING THIS TO HIM. PT EXPRESSED UNDERSTANDING AND IS AGREEABLE.

## 2024-05-15 NOTE — TELEPHONE ENCOUNTER
PATIENT CALLED AND WOULD LIKE A CALL BACK.    SHE SAID WE ARE SCHEDULING AN MRI FOR HER, AND SHE DOESN'T WANT THE MRI DONE IN [place], BECAUSE THEY DO NOT HAVE AN OPEN MRI.    SHE SAID THAT THEY HAVE AN OPEN MRI AT THE OFFICE OF [facility], HER PCP.  SHE SAID SHE COULD HAVE IT DONE THERE, AND THEN THEY COULD SEND DR. CROW THE REPORT.    I TOLD HER THAT [facility] SAID THEY WILL CALL HER BACK WHEN THEY GET EVERYTHING FIGURED OUT.

## 2024-05-15 NOTE — TELEPHONE ENCOUNTER
[facility] DEPT, ANTIONETTE, CALLED AND SAID THAT MS FLORES WAS THERE, HER GFR WAS TOO LOW FOR CONTRASTED SCAN, AND WHAT DID WE WANT TO DO. ADVISED HER THAT, PER CHART REVIEW, SHE WAS SUPPOSED TO HAVE HYDRATION PROTOCOL FOR THIS SCAN. ANTIONETTE SAYS THERE IS NO ORDERS FOR THAT AND IT HAS TO BE ARRANGED DIRECTLY WITH [employer]. ADVISED HER TO SEND PT HOME AND SOMEONE WOULD CONTACT HER REGARDING THE NEXT STEP.

## 2024-05-15 NOTE — TELEPHONE ENCOUNTER
SPOKE TO MARGUERITE AT DR CARVER'S OFFICE. SHE SAYS HE HASN'T BEEN IN THE OFFICE IN A WHILE. SHE SAID HE HASN'T MADE THE ARRANGEMENTS FOR HYDRATION AND SCAN FOR THE PT. SHE SAYS THAT DR VELAZQUEZ IS IN THE OFFICE TODAY AND SHE WILL DISCUSS THIS WITH HIM. ADVISED HER THAT DR SAUER IS DEFERRING THIS TO NEPHROLOGY. ADVISED HER THAT WE HAD [place] TELL THE PT TO GO HOME AND THAT SOMEONE WOULD CALL HER WITH THE PLAN OF CARE.

## 2024-05-31 ENCOUNTER — TELEPHONE (OUTPATIENT)
Dept: UROLOGY | Facility: CLINIC | Age: 74
End: 2024-05-31
Payer: MEDICARE

## 2024-05-31 NOTE — TELEPHONE ENCOUNTER
Called pt, and rescheduled pt to come in on 7/10/24 @ 11:30 due to MRI being completed on 7/3/24 with [facility].

## 2024-07-03 ENCOUNTER — HOSPITAL ENCOUNTER (OUTPATIENT)
Dept: OTHER | Facility: HOSPITAL | Age: 74
Discharge: HOME | End: 2024-07-03

## 2024-07-10 ENCOUNTER — OFFICE VISIT (OUTPATIENT)
Dept: UROLOGY | Facility: CLINIC | Age: 74
End: 2024-07-10
Payer: MEDICARE

## 2024-07-10 VITALS
RESPIRATION RATE: 16 BRPM | SYSTOLIC BLOOD PRESSURE: 142 MMHG | WEIGHT: 187.6 LBS | DIASTOLIC BLOOD PRESSURE: 82 MMHG | HEIGHT: 59 IN | BODY MASS INDEX: 37.82 KG/M2

## 2024-07-10 DIAGNOSIS — C64.1 RENAL CELL CARCINOMA OF RIGHT KIDNEY: Primary | ICD-10-CM

## 2024-07-10 RX ORDER — CALCITRIOL 0.25 UG/1
0.25 CAPSULE, LIQUID FILLED ORAL
COMMUNITY
Start: 2024-06-18 | End: 2024-09-17

## 2024-07-10 NOTE — PROGRESS NOTES
UROLOGY OFFICE FOLLOW UP NOTE    Subjective   HPI  Bisi Bhatt is a 74 y.o. female.  Presents for postop visit after right radical nephrectomy 1/8/2024.  Patient states she is doing well.  Denies pain.  No specific concerns.  States PCP is monitoring her kidney function; has recheck next week.      Update 7/11/2024:   Presents for follow-up right renal cell carcinoma, MRI with contrast prior.  Seeing nephrology who approved obtaining contrast MRI.  History of Present Illness  She consults nephrology on a monthly basis and has an upcoming appointment on 06/30/2023. Jardiance was initiated, however, she reports experiencing fatigue, although she believes it is improving.      _________  MRI abdomen with and without contrast 7/3/2024: Right nephrectomy changes. No new enhancing mass in the nephrectomy bed. No new retroperitoneal adenopathy. No suspicious pathologic enhancement in the abdomen or pelvis otherwise.   Stable benign 4 cm cyst left kidney shows no enhancement. No hydronephrosis.       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 No tumor thrombus within the right renal vein or inferior vena cava. No adjacent retroperitoneal lymphadenopathy. 4 cm exophytic cyst of the posterior cortex midsection of left kidney.      Creatinine   6/10/2024: 2.3 (GFR 22)  1/18/2024: 1.7  1/15/2024: 1.9  1/9/2024: 1.34  12/27/2023: 0.94  9/27/2023: 1.1    Review of systems  A review of systems was performed, and positive findings are noted in the HPI.    Objective     Vital Signs:   /82   Resp 16   Ht 149.9 cm  "(59\")   Wt 85.1 kg (187 lb 9.6 oz)   BMI 37.89 kg/m²       Physical exam  No acute distress, well-nourished  Awake alert and oriented  Mood normal; affect normal  Physical Exam        Problem List:  Patient Active Problem List   Diagnosis    Bowel disease    Congestive heart failure    High blood pressure    High cholesterol    Shortness of breath    Stroke    Urinary incontinence, urge    Vaginal atrophy    Acute cystitis without hematuria    Renal mass, right    Right renal mass       Assessment & Plan   Diagnoses and all orders for this visit:    1. Renal cell carcinoma of right kidney (Primary)  -     MRI Abdomen With & Without Contrast; Future  -     CBC (No Diff); Future  -     Basic Metabolic Panel; Future  -     Urinalysis With Microscopic - Urine, Clean Catch; Future  -     XR Chest 2 View; Future        Assessment & Plan  pT1b papillary renal cell carcinoma;   MRI without evidence of recurrence      There is no evidence of abnormal tissue or masses at the site of the nephrectomy, and no enlargement of lymph nodes.     Continuation of surveillance is advised. According to the NCCN and AUA guidelines, a follow-up appointment is scheduled for 1 year from now, which will include abdominal imaging with contrast, contingent upon the nephrologist's advice, kidney function, chest x-ray, and blood work.    Follow-up  A follow-up appointment is scheduled for 1 year from now.,  Surveillance imaging and labs prior       All questions addressed      Patient or patient representative verbalized consent for the use of Ambient Listening during the visit with  Genesis Suarez MD for chart documentation. 7/11/2024  22:37 EDT  "

## 2024-07-31 ENCOUNTER — TELEPHONE (OUTPATIENT)
Dept: UROLOGY | Facility: CLINIC | Age: 74
End: 2024-07-31
Payer: MEDICARE

## 2024-07-31 NOTE — TELEPHONE ENCOUNTER
Shilpi from [facility] nephrology called and states she was returning a call to Dr Parker staff about MRI results, she states she is going to fax the results over to our office and can also be reached at [phone] for her staff to call her back.

## 2024-07-31 NOTE — TELEPHONE ENCOUNTER
Called and spoke to Shilpi, she stated she will call radiology and try to have the imaging pushed threw.

## 2024-07-31 NOTE — TELEPHONE ENCOUNTER
CALLED AND SPOKE TO ALONZO. INFORMED THAT SHE CAN SEND IT TO [facility]. SHE STATES SHE POWERSHARED IT OVER. CALLED RADIOLOGY AND HAD IT PUT INTO PT CHART.

## 2024-07-31 NOTE — TELEPHONE ENCOUNTER
MARGUERITE CALLED FROM [facility].  SHE SAID THAT THE PATIENT HAD THE MRI AT [facility].  SHE SAID SHE SPOKE TO ALONZO THERE, AND SHE SAID SHE WOULD POWER SHARE IT TO .  MARGUERITE TOLD HER IT WOULD NEED TO GO TO  UROLOGY TO DR. CROW.  SHE SAID SHE WASN'T SURE IF SHE HAD US IN HER DATABASE.    MARGUERITE SAID ALONZO'S PHONE NUMBER [phone] IF YOU HAVE ANY QUESTIONS

## 2025-03-27 ENCOUNTER — HOSPITAL ENCOUNTER (OUTPATIENT)
Dept: OTHER | Facility: HOSPITAL | Age: 75
Discharge: HOME | End: 2025-03-27

## 2025-03-28 ENCOUNTER — RESULTS FOLLOW-UP (OUTPATIENT)
Dept: UROLOGY | Age: 75
End: 2025-03-28
Payer: MEDICARE

## 2025-03-28 NOTE — TELEPHONE ENCOUNTER
Call made to pt to discuss CT results per MD; RN states per MD no concerning findings from a urology standpoint on scan.  Plan to discuss further at her scheduled appointment. Also, per MD The radiologist did make note of her a finding of her bowel and their recommendation is to ensure she has had a colonoscopy according to routine  guidelines.  encourage her to discuss this further with her PCP. Pt states understanding and is agreeable; has looked at results; no further questions at this time.

## 2025-07-17 ENCOUNTER — OFFICE VISIT (OUTPATIENT)
Dept: UROLOGY | Age: 75
End: 2025-07-17
Payer: MEDICARE

## 2025-07-17 VITALS — WEIGHT: 187 LBS | HEIGHT: 59 IN | BODY MASS INDEX: 37.7 KG/M2

## 2025-07-17 DIAGNOSIS — C64.1 RENAL CELL CARCINOMA OF RIGHT KIDNEY: Primary | ICD-10-CM

## 2025-07-17 DIAGNOSIS — Z90.5 SOLITARY KIDNEY, ACQUIRED: ICD-10-CM

## 2025-07-17 NOTE — PROGRESS NOTES
UROLOGY OFFICE FOLLOW UP NOTE    Subjective   HPI  Bisi Bhatt is a 75 y.o. female. Presents for postop visit after right radical nephrectomy 1/8/2024.  Patient states she is doing well.  Denies pain.  No specific concerns.  States PCP is monitoring her kidney function; has recheck next week.      Update 7/11/2024:   Presents for follow-up right renal cell carcinoma, MRI with contrast prior.  Seeing nephrology who approved obtaining contrast MRI.  History of Present Illness  She consults nephrology on a monthly basis and has an upcoming appointment on 06/30/2023. Jardiance was initiated, however, she reports experiencing fatigue, although she believes it is improving.    Update 7/17/2025: Presents for follow-up right renal cell carcinoma, treated with right radical nephrectomy 1/8/2024; labs and MRI with contrast prior.  History of Present Illness  She reports feeling well overall. She continues to be under the care of a nephrologist and her primary care physician.  She has been experiencing a decrease in energy levels over the past week and is curious if this could be related to her kidney condition.         _________  MRI abdomen wwo 3/27/2025: There is no MR evidence of recurrent or metastatic renal cell carcinoma. No suspicious signal changes in the right renal bed. no pathologically enlarged lymph nodes.   2. Short segment of abnormal narrowing and wall thickening sigmoid colon, benign focal contraction or scar versus malignancy, if the patient has not had recent colonoscopy, recommend this be done.     MRI abdomen with and without contrast 7/3/2024: Right nephrectomy changes. No new enhancing mass in the nephrectomy bed. No new retroperitoneal adenopathy. No suspicious pathologic enhancement in the abdomen or pelvis otherwise.   Stable benign 4 cm cyst left kidney shows no enhancement. No hydronephrosis.         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 No tumor thrombus within the right renal vein or inferior vena cava. No adjacent retroperitoneal lymphadenopathy. 4 cm exophytic cyst of the posterior cortex midsection of left kidney.      Creatinine   5/21/2025: 2.2  6/10/2024: 2.3 (GFR 22)  1/18/2024: 1.7  1/15/2024: 1.9  1/9/2024: 1.34  12/27/2023: 0.94  9/27/2023: 1.1          Review of systems  A review of systems was performed, and positive findings are noted in the HPI.    Objective     Vital Signs:   Ht 149.9 cm (59\")   Wt 84.8 kg (187 lb)   BMI 37.77 kg/m²       Physical exam  No acute distress, well-nourished  Awake alert and oriented  Mood normal; affect normal  Physical Exam      Problem List:  Patient Active Problem List   Diagnosis    Bowel disease    Congestive heart failure    High blood pressure    High cholesterol    Shortness of breath    Stroke    Urinary incontinence, urge    Vaginal atrophy    Acute cystitis without hematuria    Renal mass, right    Right renal mass       Assessment & Plan   Diagnoses and all orders for this visit:    1. Renal cell carcinoma of right kidney (Primary)  -     Basic Metabolic Panel; Future  -     CBC (No Diff); Future  -     Urinalysis With Microscopic If Indicated (No Culture) - Urine, Clean Catch; Future  -     MRI Abdomen With & Without Contrast; Future  -     XR Chest 2 View; Future  -     XR Chest 2 View; Future    2. Solitary kidney, acquired    Other orders  -     MRI Outside Films; Future      pT1b papillary renal cell carcinoma;   MRI without evidence of recurrence      Continuation of surveillance is advised. According to the NCCN and AUA " guidelines, a follow-up appointment is scheduled for 1 year from now, which will include abdominal imaging with contrast, contingent upon the nephrologist's approval, kidney function, chest x-ray, and blood work.     Follow-up  A follow-up appointment is scheduled for 1 year from now.,  Surveillance imaging and labs prior  Assessment & Plan  Kidney function appears stable.      Follow-up guidelines for stage T1b suggest a 5-year period.     Patient to obtain chest x-ray at her convenience; will notify her of result once obtained; provided order in hand     Advised to maintain adequate hydration and consider follow-up PCP, nephrology getting blood work done to address recent decrease in energy levels.    Follow-up 1 year, MRI abdomen with and without, chest x-ray, and labs prior       All questions addressed      Patient or patient representative verbalized consent for the use of Ambient Listening during the visit with  Genesis Suarez MD for chart documentation. 7/17/2025  11:11 EDT

## 2025-07-24 ENCOUNTER — RESULTS FOLLOW-UP (OUTPATIENT)
Dept: UROLOGY | Age: 75
End: 2025-07-24
Payer: MEDICARE

## 2025-07-24 NOTE — TELEPHONE ENCOUNTER
Call made to pt concerning CXR results per MD; per MD no concerning findings and pt to f/u as scheduled; pt is understanding and agreeable; f/u sched for next year.

## 2025-08-11 ENCOUNTER — OFFICE VISIT (OUTPATIENT)
Dept: SURGERY | Facility: CLINIC | Age: 75
End: 2025-08-11
Payer: MEDICARE

## 2025-08-11 VITALS
SYSTOLIC BLOOD PRESSURE: 130 MMHG | BODY MASS INDEX: 37.7 KG/M2 | WEIGHT: 187 LBS | HEART RATE: 68 BPM | DIASTOLIC BLOOD PRESSURE: 72 MMHG | OXYGEN SATURATION: 94 % | HEIGHT: 59 IN

## 2025-08-11 DIAGNOSIS — R10.30 LOWER ABDOMINAL PAIN: ICD-10-CM

## 2025-08-11 DIAGNOSIS — R93.89 ABNORMAL MRI: Primary | ICD-10-CM

## 2025-08-11 PROCEDURE — 3075F SYST BP GE 130 - 139MM HG: CPT | Performed by: NURSE PRACTITIONER

## 2025-08-11 PROCEDURE — 99213 OFFICE O/P EST LOW 20 MIN: CPT | Performed by: NURSE PRACTITIONER

## 2025-08-11 PROCEDURE — 3078F DIAST BP <80 MM HG: CPT | Performed by: NURSE PRACTITIONER

## 2025-08-11 PROCEDURE — 1159F MED LIST DOCD IN RCRD: CPT | Performed by: NURSE PRACTITIONER

## 2025-08-11 PROCEDURE — 1160F RVW MEDS BY RX/DR IN RCRD: CPT | Performed by: NURSE PRACTITIONER

## 2025-08-12 ENCOUNTER — TELEPHONE (OUTPATIENT)
Dept: SURGERY | Facility: CLINIC | Age: 75
End: 2025-08-12
Payer: MEDICARE

## 2025-08-13 ENCOUNTER — PREP FOR SURGERY (OUTPATIENT)
Dept: OTHER | Facility: HOSPITAL | Age: 75
End: 2025-08-13
Payer: MEDICARE

## 2025-08-13 DIAGNOSIS — R10.30 LOWER ABDOMINAL PAIN: ICD-10-CM

## 2025-08-13 DIAGNOSIS — R93.89 ABNORMAL MRI: Primary | ICD-10-CM

## 2025-08-13 RX ORDER — SODIUM CHLORIDE 0.9 % (FLUSH) 0.9 %
3 SYRINGE (ML) INJECTION EVERY 12 HOURS SCHEDULED
OUTPATIENT
Start: 2025-08-13

## 2025-08-13 RX ORDER — SODIUM, POTASSIUM,MAG SULFATES 17.5-3.13G
SOLUTION, RECONSTITUTED, ORAL ORAL
Qty: 354 ML | Refills: 0 | Status: SHIPPED | OUTPATIENT
Start: 2025-08-13

## 2025-08-13 RX ORDER — SODIUM CHLORIDE 9 MG/ML
40 INJECTION, SOLUTION INTRAVENOUS AS NEEDED
OUTPATIENT
Start: 2025-08-13

## 2025-08-13 RX ORDER — SODIUM CHLORIDE 0.9 % (FLUSH) 0.9 %
10 SYRINGE (ML) INJECTION AS NEEDED
OUTPATIENT
Start: 2025-08-13

## 2025-08-18 ENCOUNTER — TELEPHONE (OUTPATIENT)
Dept: SURGERY | Facility: CLINIC | Age: 75
End: 2025-08-18
Payer: MEDICARE

## 2025-08-22 ENCOUNTER — TELEPHONE (OUTPATIENT)
Dept: SURGERY | Facility: CLINIC | Age: 75
End: 2025-08-22
Payer: MEDICARE

## (undated) DEVICE — ECHELON FLEX POWERED PLUS ARTICULATING ENDOSCOPIC LINEAR CUTTER , 60MM: Brand: ECHELON FLEX

## (undated) DEVICE — 2, DISPOSABLE SUCTION/IRRIGATOR WITHOUT DISPOSABLE TIP: Brand: STRYKEFLOW

## (undated) DEVICE — SUT MNCRYL PLS ANTIB UD 4/0 PS2 18IN

## (undated) DEVICE — STERILE POLYISOPRENE POWDER-FREE SURGICAL GLOVES: Brand: PROTEXIS

## (undated) DEVICE — SUT VIC PLS CTD BR 0 TIE 18IN VIL

## (undated) DEVICE — TIP COVER ACCESSORY

## (undated) DEVICE — LAPAROSCOPIC SCISSORS: Brand: EPIX LAPAROSCOPIC SCISSORS

## (undated) DEVICE — PENCL E/S SMOKEEVAC W/TELESCP CANN

## (undated) DEVICE — TOWEL,OR,DSP,ST,BLUE,STD,4/PK,20PK/CS: Brand: MEDLINE

## (undated) DEVICE — ARM DRAPE

## (undated) DEVICE — TROCAR: Brand: KII FIOS FIRST ENTRY

## (undated) DEVICE — SUT VIC 2/0 SH 27IN

## (undated) DEVICE — BLADELESS OBTURATOR: Brand: WECK VISTA

## (undated) DEVICE — SOL IRR NACL 0.9PCT BT 1000ML

## (undated) DEVICE — TOTAL TRAY, 16FR 10ML SIL FOLEY, URN: Brand: MEDLINE

## (undated) DEVICE — SUT PDS 0 CTX 36IN DYED Z370T

## (undated) DEVICE — SLV SCD KN/LEN ADJ EXPRSS BLENDED MD 1P/U

## (undated) DEVICE — CANNULA SEAL

## (undated) DEVICE — SOL NACL 0.9PCT 1000ML

## (undated) DEVICE — APPL HEMO SURG ARISTA/AH/FLEXITIP XL 38CM

## (undated) DEVICE — LAPAROSCOPIC TROCAR SLEEVE/SINGLE USE: Brand: KII® OPTICAL ACCESS SYSTEM

## (undated) DEVICE — ECHELON FLEX 60 ARTICULATING ENDOSCOPIC LINEAR CUTTER (NO CARTRIDGE): Brand: ECHELON FLEX ENDOPATH

## (undated) DEVICE — GAMMEX® NON-LATEX SIZE 7.5, STERILE NEOPRENE POWDER-FREE SURGICAL GLOVE: Brand: GAMMEX

## (undated) DEVICE — DAVINCI-LF: Brand: MEDLINE INDUSTRIES, INC.